# Patient Record
Sex: FEMALE | Race: WHITE | NOT HISPANIC OR LATINO | Employment: OTHER | URBAN - METROPOLITAN AREA
[De-identification: names, ages, dates, MRNs, and addresses within clinical notes are randomized per-mention and may not be internally consistent; named-entity substitution may affect disease eponyms.]

---

## 2017-02-13 ENCOUNTER — OFFICE VISIT (OUTPATIENT)
Dept: OBGYN CLINIC | Facility: HOSPITAL | Age: 82
End: 2017-02-13
Payer: MEDICARE

## 2017-02-20 ENCOUNTER — APPOINTMENT (OUTPATIENT)
Dept: LAB | Facility: HOSPITAL | Age: 82
End: 2017-02-20
Payer: MEDICARE

## 2017-02-20 ENCOUNTER — TRANSCRIBE ORDERS (OUTPATIENT)
Dept: LAB | Facility: HOSPITAL | Age: 82
End: 2017-02-20

## 2017-02-20 ENCOUNTER — OFFICE VISIT (OUTPATIENT)
Dept: OBGYN CLINIC | Facility: HOSPITAL | Age: 82
End: 2017-02-20
Payer: MEDICARE

## 2017-02-20 DIAGNOSIS — I10 ESSENTIAL (PRIMARY) HYPERTENSION: ICD-10-CM

## 2017-02-20 DIAGNOSIS — R54 AGE-RELATED PHYSICAL DEBILITY: ICD-10-CM

## 2017-02-20 DIAGNOSIS — E78.5 HYPERLIPIDEMIA: ICD-10-CM

## 2017-02-20 DIAGNOSIS — R51.9 HEADACHE: ICD-10-CM

## 2017-02-20 LAB
ALBUMIN SERPL BCP-MCNC: 3.5 G/DL (ref 3.5–5)
ALP SERPL-CCNC: 71 U/L (ref 46–116)
ALT SERPL W P-5'-P-CCNC: 20 U/L (ref 12–78)
ANION GAP SERPL CALCULATED.3IONS-SCNC: 7 MMOL/L (ref 4–13)
AST SERPL W P-5'-P-CCNC: 12 U/L (ref 5–45)
BILIRUB SERPL-MCNC: 0.5 MG/DL (ref 0.2–1)
BUN SERPL-MCNC: 24 MG/DL (ref 5–25)
CALCIUM SERPL-MCNC: 9.4 MG/DL (ref 8.3–10.1)
CHLORIDE SERPL-SCNC: 102 MMOL/L (ref 100–108)
CHOLEST SERPL-MCNC: 233 MG/DL (ref 50–200)
CO2 SERPL-SCNC: 29 MMOL/L (ref 21–32)
CREAT SERPL-MCNC: 1.2 MG/DL (ref 0.6–1.3)
ERYTHROCYTE [DISTWIDTH] IN BLOOD BY AUTOMATED COUNT: 12.9 % (ref 11.6–15.1)
GFR SERPL CREATININE-BSD FRML MDRD: 42.1 ML/MIN/1.73SQ M
GLUCOSE SERPL-MCNC: 94 MG/DL (ref 65–140)
HCT VFR BLD AUTO: 41.1 % (ref 34.8–46.1)
HDLC SERPL-MCNC: 76 MG/DL (ref 40–60)
HGB BLD-MCNC: 13.9 G/DL (ref 11.5–15.4)
LDLC SERPL CALC-MCNC: 135 MG/DL (ref 0–100)
MCH RBC QN AUTO: 29.5 PG (ref 26.8–34.3)
MCHC RBC AUTO-ENTMCNC: 33.8 G/DL (ref 31.4–37.4)
MCV RBC AUTO: 87 FL (ref 82–98)
PLATELET # BLD AUTO: 206 THOUSANDS/UL (ref 149–390)
PMV BLD AUTO: 11.4 FL (ref 8.9–12.7)
POTASSIUM SERPL-SCNC: 4.7 MMOL/L (ref 3.5–5.3)
PROT SERPL-MCNC: 8.5 G/DL (ref 6.4–8.2)
RBC # BLD AUTO: 4.71 MILLION/UL (ref 3.81–5.12)
SODIUM SERPL-SCNC: 138 MMOL/L (ref 136–145)
TRIGL SERPL-MCNC: 112 MG/DL
WBC # BLD AUTO: 7.31 THOUSAND/UL (ref 4.31–10.16)

## 2017-02-20 PROCEDURE — 80061 LIPID PANEL: CPT

## 2017-02-20 PROCEDURE — 85027 COMPLETE CBC AUTOMATED: CPT

## 2017-02-20 PROCEDURE — 80053 COMPREHEN METABOLIC PANEL: CPT

## 2017-02-20 PROCEDURE — 36415 COLL VENOUS BLD VENIPUNCTURE: CPT

## 2017-03-02 ENCOUNTER — ALLSCRIPTS OFFICE VISIT (OUTPATIENT)
Dept: OTHER | Facility: OTHER | Age: 82
End: 2017-03-02

## 2017-03-10 ENCOUNTER — ALLSCRIPTS OFFICE VISIT (OUTPATIENT)
Dept: OTHER | Facility: OTHER | Age: 82
End: 2017-03-10

## 2017-03-22 ENCOUNTER — GENERIC CONVERSION - ENCOUNTER (OUTPATIENT)
Dept: OTHER | Facility: OTHER | Age: 82
End: 2017-03-22

## 2017-04-06 ENCOUNTER — OFFICE VISIT (OUTPATIENT)
Dept: OBGYN CLINIC | Facility: HOSPITAL | Age: 82
End: 2017-04-06
Payer: MEDICARE

## 2017-04-13 ENCOUNTER — OFFICE VISIT (OUTPATIENT)
Dept: OBGYN CLINIC | Facility: HOSPITAL | Age: 82
End: 2017-04-13
Payer: MEDICARE

## 2017-05-26 ENCOUNTER — GENERIC CONVERSION - ENCOUNTER (OUTPATIENT)
Dept: OTHER | Facility: OTHER | Age: 82
End: 2017-05-26

## 2017-05-26 ENCOUNTER — GENERIC CONVERSION - ENCOUNTER (OUTPATIENT)
Dept: FAMILY MEDICINE CLINIC | Facility: CLINIC | Age: 82
End: 2017-05-26

## 2017-05-31 ENCOUNTER — OFFICE VISIT (OUTPATIENT)
Dept: OBGYN CLINIC | Facility: HOSPITAL | Age: 82
End: 2017-05-31
Payer: MEDICARE

## 2017-06-06 ENCOUNTER — OFFICE VISIT (OUTPATIENT)
Dept: OBGYN CLINIC | Facility: HOSPITAL | Age: 82
End: 2017-06-06
Payer: MEDICARE

## 2017-07-17 DIAGNOSIS — N28.9 DISORDER OF KIDNEY AND URETER: ICD-10-CM

## 2017-07-17 DIAGNOSIS — I10 ESSENTIAL (PRIMARY) HYPERTENSION: ICD-10-CM

## 2017-07-17 DIAGNOSIS — E78.5 HYPERLIPIDEMIA: ICD-10-CM

## 2017-07-20 ENCOUNTER — TRANSCRIBE ORDERS (OUTPATIENT)
Dept: LAB | Facility: HOSPITAL | Age: 82
End: 2017-07-20

## 2017-07-20 ENCOUNTER — APPOINTMENT (OUTPATIENT)
Dept: LAB | Facility: HOSPITAL | Age: 82
End: 2017-07-20
Payer: MEDICARE

## 2017-07-20 DIAGNOSIS — N28.9 DISORDER OF KIDNEY AND URETER: ICD-10-CM

## 2017-07-20 DIAGNOSIS — E78.5 HYPERLIPIDEMIA: ICD-10-CM

## 2017-07-20 DIAGNOSIS — I10 ESSENTIAL (PRIMARY) HYPERTENSION: ICD-10-CM

## 2017-07-20 LAB
ALBUMIN SERPL BCP-MCNC: 3.4 G/DL (ref 3.5–5)
ALP SERPL-CCNC: 69 U/L (ref 46–116)
ALT SERPL W P-5'-P-CCNC: 19 U/L (ref 12–78)
ANION GAP SERPL CALCULATED.3IONS-SCNC: 8 MMOL/L (ref 4–13)
AST SERPL W P-5'-P-CCNC: 13 U/L (ref 5–45)
BASOPHILS # BLD AUTO: 0.03 THOUSANDS/ΜL (ref 0–0.1)
BASOPHILS NFR BLD AUTO: 0 % (ref 0–1)
BILIRUB SERPL-MCNC: 0.49 MG/DL (ref 0.2–1)
BUN SERPL-MCNC: 28 MG/DL (ref 5–25)
CALCIUM SERPL-MCNC: 9.1 MG/DL (ref 8.3–10.1)
CHLORIDE SERPL-SCNC: 106 MMOL/L (ref 100–108)
CHOLEST SERPL-MCNC: 204 MG/DL (ref 50–200)
CO2 SERPL-SCNC: 26 MMOL/L (ref 21–32)
CREAT SERPL-MCNC: 1.3 MG/DL (ref 0.6–1.3)
EOSINOPHIL # BLD AUTO: 0.2 THOUSAND/ΜL (ref 0–0.61)
EOSINOPHIL NFR BLD AUTO: 3 % (ref 0–6)
ERYTHROCYTE [DISTWIDTH] IN BLOOD BY AUTOMATED COUNT: 12.7 % (ref 11.6–15.1)
GFR SERPL CREATININE-BSD FRML MDRD: 38.3 ML/MIN/1.73SQ M
GLUCOSE P FAST SERPL-MCNC: 101 MG/DL (ref 65–99)
HCT VFR BLD AUTO: 37 % (ref 34.8–46.1)
HDLC SERPL-MCNC: 60 MG/DL (ref 40–60)
HGB BLD-MCNC: 12.9 G/DL (ref 11.5–15.4)
LDLC SERPL CALC-MCNC: 123 MG/DL (ref 0–100)
LYMPHOCYTES # BLD AUTO: 2.18 THOUSANDS/ΜL (ref 0.6–4.47)
LYMPHOCYTES NFR BLD AUTO: 32 % (ref 14–44)
MCH RBC QN AUTO: 30.1 PG (ref 26.8–34.3)
MCHC RBC AUTO-ENTMCNC: 34.9 G/DL (ref 31.4–37.4)
MCV RBC AUTO: 86 FL (ref 82–98)
MONOCYTES # BLD AUTO: 0.29 THOUSAND/ΜL (ref 0.17–1.22)
MONOCYTES NFR BLD AUTO: 4 % (ref 4–12)
NEUTROPHILS # BLD AUTO: 4.19 THOUSANDS/ΜL (ref 1.85–7.62)
NEUTS SEG NFR BLD AUTO: 61 % (ref 43–75)
NRBC BLD AUTO-RTO: 0 /100 WBCS
PLATELET # BLD AUTO: 177 THOUSANDS/UL (ref 149–390)
PMV BLD AUTO: 11.8 FL (ref 8.9–12.7)
POTASSIUM SERPL-SCNC: 4.5 MMOL/L (ref 3.5–5.3)
PROT SERPL-MCNC: 7.9 G/DL (ref 6.4–8.2)
RBC # BLD AUTO: 4.29 MILLION/UL (ref 3.81–5.12)
SODIUM SERPL-SCNC: 140 MMOL/L (ref 136–145)
TRIGL SERPL-MCNC: 106 MG/DL
TSH SERPL DL<=0.05 MIU/L-ACNC: 2.43 UIU/ML (ref 0.36–3.74)
WBC # BLD AUTO: 6.9 THOUSAND/UL (ref 4.31–10.16)

## 2017-07-20 PROCEDURE — 80053 COMPREHEN METABOLIC PANEL: CPT

## 2017-07-20 PROCEDURE — 80061 LIPID PANEL: CPT

## 2017-07-20 PROCEDURE — 84443 ASSAY THYROID STIM HORMONE: CPT

## 2017-07-20 PROCEDURE — 36415 COLL VENOUS BLD VENIPUNCTURE: CPT

## 2017-07-20 PROCEDURE — 85025 COMPLETE CBC W/AUTO DIFF WBC: CPT

## 2017-08-01 ENCOUNTER — OFFICE VISIT (OUTPATIENT)
Dept: OBGYN CLINIC | Facility: HOSPITAL | Age: 82
End: 2017-08-01
Payer: MEDICARE

## 2017-08-03 ENCOUNTER — ALLSCRIPTS OFFICE VISIT (OUTPATIENT)
Dept: OTHER | Facility: OTHER | Age: 82
End: 2017-08-03

## 2017-08-08 ENCOUNTER — OFFICE VISIT (OUTPATIENT)
Dept: OBGYN CLINIC | Facility: HOSPITAL | Age: 82
End: 2017-08-08
Payer: MEDICARE

## 2017-08-28 DIAGNOSIS — N18.30 CHRONIC KIDNEY DISEASE, STAGE III (MODERATE) (HCC): ICD-10-CM

## 2017-08-28 DIAGNOSIS — I10 ESSENTIAL (PRIMARY) HYPERTENSION: ICD-10-CM

## 2017-08-31 ENCOUNTER — APPOINTMENT (OUTPATIENT)
Dept: LAB | Facility: HOSPITAL | Age: 82
End: 2017-08-31
Payer: MEDICARE

## 2017-08-31 ENCOUNTER — TRANSCRIBE ORDERS (OUTPATIENT)
Dept: LAB | Facility: HOSPITAL | Age: 82
End: 2017-08-31

## 2017-08-31 DIAGNOSIS — N18.30 CHRONIC KIDNEY DISEASE, STAGE III (MODERATE) (HCC): ICD-10-CM

## 2017-08-31 DIAGNOSIS — I10 ESSENTIAL (PRIMARY) HYPERTENSION: ICD-10-CM

## 2017-08-31 LAB
ANION GAP SERPL CALCULATED.3IONS-SCNC: 6 MMOL/L (ref 4–13)
BUN SERPL-MCNC: 21 MG/DL (ref 5–25)
CALCIUM SERPL-MCNC: 9.3 MG/DL (ref 8.3–10.1)
CHLORIDE SERPL-SCNC: 102 MMOL/L (ref 100–108)
CO2 SERPL-SCNC: 28 MMOL/L (ref 21–32)
CREAT SERPL-MCNC: 1.02 MG/DL (ref 0.6–1.3)
GFR SERPL CREATININE-BSD FRML MDRD: 48 ML/MIN/1.73SQ M
GLUCOSE SERPL-MCNC: 86 MG/DL (ref 65–140)
POTASSIUM SERPL-SCNC: 4.4 MMOL/L (ref 3.5–5.3)
SODIUM SERPL-SCNC: 136 MMOL/L (ref 136–145)

## 2017-08-31 PROCEDURE — 80048 BASIC METABOLIC PNL TOTAL CA: CPT

## 2017-08-31 PROCEDURE — 36415 COLL VENOUS BLD VENIPUNCTURE: CPT

## 2017-09-04 ENCOUNTER — GENERIC CONVERSION - ENCOUNTER (OUTPATIENT)
Dept: OTHER | Facility: OTHER | Age: 82
End: 2017-09-04

## 2017-10-06 ENCOUNTER — ALLSCRIPTS OFFICE VISIT (OUTPATIENT)
Dept: OTHER | Facility: OTHER | Age: 82
End: 2017-10-06

## 2017-10-07 NOTE — PROGRESS NOTES
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)    Plan  CKD (chronic kidney disease), stage III, Hypertension    · AmLODIPine Besylate 2 5 MG Oral Tablet; TAKE 1 TABLET DAILY  Influenza vaccine needed    · Fluzone High-Dose 0 5 ML Intramuscular Suspension Prefilled Syringe    Discussion/Summary    BP is stable on current regimen  has improved on calcium channel blocker and lower dose of ACE-inhibitor  agreement contract updated today  has follow-up appointment in February with repeat labs  The patient was counseled regarding diagnostic results,-instructions for management,-risk factor reductions,-prognosis,-patient and family education,-impressions,-risks and benefits of treatment options,-importance of compliance with treatment  Possible side effects of new medications were reviewed with the patient/guardian today  The treatment plan was reviewed with the patient/guardian  The patient/guardian understands and agrees with the treatment plan     Self Referrals: No      Chief Complaint  pt here for follow up visitwill receive flu vaccine todayscale negative      History of Present Illness  This is a follow medication adjustment - added amlodipine 2 5 mg daily/ pt's daughter decrease lisinopril by half as she thought this was the instructions  daughter reports 's/60's at home - on HCTZ 12 5/ amlodipine 2 5 and 1/2 tab lisinopril 40 mg today is in the 140s  noted and creatinine 1 02 on this regimen  Patient denies dizziness or GI upset  also here for opioid agreement contract update  is taking tramadol in am for knee pain and as needed Tylenol  does take it twice a day for sever pain - bad days  PDMP rviewed           Review of Systems    Constitutional: No fever, no chills, feels well, no tiredness, no recent weight gain or weight loss-and-as noted in HPI  Cardiovascular: No complaints of slow heart rate, no fast heart rate, no chest pain, no palpitations, no leg claudication, no lower extremity edema  Respiratory: No complaints of shortness of breath, no wheezing, no cough, no SOB on exertion, no orthopnea, no PND  Gastrointestinal: No complaints of abdominal pain, no constipation, no nausea or vomiting, no diarrhea, no bloody stools  Neurological: No complaints of headache, no confusion, no convulsions, no numbness, no dizziness or fainting, no tingling, no limb weakness, no difficulty walking  Active Problems  1  Cervicalgia (723 1) (M54 2)   2  CKD (chronic kidney disease), stage III (585 3) (N18 3)   3  Copy of advanced directive obtained (V49 89) (Z78 9)   4  Counseling regarding advanced directives and goals of care (V65 49) (Z71 89)   5  Dyslipidemia (272 4) (E78 5)   6  Encounter for Medicare annual wellness exam (V70 0) (Z00 00)   7  Encounter for screening for malignant neoplasm of colon (V76 51) (Z12 11)   8  Encounter for screening for other nervous system disorder (V80 09) (Z13 858)   9  Encounter for special screening examination for genitourinary disorder (V81 6) (Z13 89)   10  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)   11  Hypertension (401 9) (I10)   12  Impacted cerumen of right ear (380 4) (H61 21)   13  Influenza vaccine needed (V04 81) (Z23)   14  History of Intermittent claudication (443 9) (I73 9)   15  Medicare annual wellness visit, subsequent (V70 0) (Z00 00)   16  Nonintractable episodic headache, unspecified headache type (784 0) (R51)   17  Osteoarthritis (715 90) (M19 90)   18  Pain management contract agreement   19  Scanned copy of advance directives on file (V49 89) (Z78 9)   20  Screening for osteoporosis (V82 81) (Z13 820)   21  Seasonal allergies (477 9) (J30 2)   22   Visit for screening mammogram (V76 12) (Z12 31)    Past Medical History  1  History of Bilateral impacted cerumen (380 4) (H61 23)   2  History of Chronic Renal Failure (585 9)   3  History of edema (V13 89) (Z87 898)   4  History of headache (V13 89) (Z87 898)   5  History of Impacted cerumen of left ear (380 4) (H61 22)   6  History of Impaired fasting glucose (790 21) (R73 01)   7  History of Intermittent claudication (443 9) (I73 9)   8  History of Reported Test For Stool Blood Positive   9  History of Vitamin D deficiency (268 9) (E55 9)    The active problems and past medical history were reviewed and updated today  Surgical History  1  History of Cataract Surgery   2  History of Knee Arthroplasty   3  History of Knee Replacement    The surgical history was reviewed and updated today  Family History  Mother    1  Family history of Diabetes Mellitus (V18 0)  Father    2  Family history of Emphysema  Sister    3  Family history of Hypertension (V17 49)  Family History    4  Family history of Does not use illicit drugs   5  No family history of mental disorder    The family history was reviewed and updated today  Social History   · Brushes teeth daily   · Copy of advanced directive obtained (V49 89) (Z78 9)   · Does not exercise (V69 0) (Z72 3)   · Does not use illicit drugs (A23 29) (Z78 9)   · Never A Smoker   · No alcohol use  The social history was reviewed and updated today  Current Meds   1  Acetaminophen 500 MG CAPS; TAKE 2 CAPSULES every 8 hrs for back pain/ HA; not to   exceed 6 tabs in a day; Therapy: 32DUO8748 to Recorded   2  AmLODIPine Besylate 2 5 MG Oral Tablet; TAKE 1 TABLET DAILY; Therapy: 08Cum5895 to (Evaluate:47Vdd9985)  Requested for: 03Aug2017; Last   Rx:03Aug2017 Ordered   3  Caltrate 600 TABS; TAKE 1 TABLET DAILY; Therapy: (Recorded:60Mdm2543) to Recorded   4  Debrox 6 5 % Otic Solution;  Instill 10 drops at night x 5 days then rinse ear canal w/   peroxide in am;   Therapy: 11JMI5228 to (Last Rx:02Mar2017)  Requested for: 53LMK7726 Ordered   5  Fish Oil 1000 MG Oral Capsule; TAKE 2 CAPSULE Daily; Therapy: (Recorded:11Zpu9542) to Recorded   6  Fluticasone Propionate 50 MCG/ACT Nasal Suspension; USE 2 SPRAYS IN EACH   NOSTRIL ONCE DAILY; Therapy: 09ORK2944 to (Last Rx:14Oct2016)  Requested for: 14Oct2016 Ordered   7  Glucosamine Chondroitin Complx Oral Capsule; TAKE 1 CAPSULE DAILY; Therapy: (Recorded:49Wjj5300) to Recorded   8  HydroCHLOROthiazide 12 5 MG Oral Capsule; TAKE 1 CAPSULE EVERY DAY; Therapy: 75MHA0639 to (Evaluate:89Hzl6443)  Requested for: 59WAN3995; Last   Rx:29Jun2017 Ordered   9  Lisinopril 40 MG Oral Tablet; TAKE 1/2 TABLET DAILY; Therapy: 04Tka4704 to (Livan Gayle)  Requested for: 48YME8395 Recorded   10  Multi Vitamin Daily Oral Tablet; Take 1 daily; Therapy: 58LBV8330 to Recorded   11  TraMADol HCl - 50 MG Oral Tablet; TAKE 1 TABLET EVERY 12 HOURS AS NEEDED; Therapy: 37QFK3034 to (Manjinder Roberts)  Requested for: 59Blm4478; Last    Rx:03Eza7184 Ordered   12  Vitamin C-Quercetin-Citrus Bio [phone] MG Oral Capsule; Therapy: 12Apr2016 to Recorded   13  Vitamin D 2000 UNIT Oral Capsule; Take 1 tablet daily; Therapy: (Recorded:43Xzj1685) to Recorded    The medication list was reviewed and updated today  Allergies  1  Norvasc TABS  Denied    2  Aceon TABS    Vitals  Vital Signs    Recorded: 34PVV7961 09:09AM   Temperature 99 F   Heart Rate 79   Systolic 531   Diastolic 64   Height 4 ft 9 in   Weight 149 lb    BMI Calculated 32 24   BSA Calculated 1 59   O2 Saturation 95   Pain Scale 0     Physical Exam    Constitutional   General appearance: No acute distress, well appearing and well nourished  Eyes   Conjunctiva and lids: No swelling, erythema or discharge  Pupils and irises: Equal, round and reactive to light  Pulmonary   Respiratory effort: No increased work of breathing or signs of respiratory distress  Auscultation of lungs: Clear to auscultation  Cardiovascular   Palpation of heart: Normal PMI, no thrills  Auscultation of heart: Normal rate and rhythm, normal S1 and S2, without murmurs  Neurologic   Cranial nerves: Cranial nerves 2-12 intact  Psychiatric   Orientation to person, place, and time: Normal  -[place]  Mood and affect: Normal          Results/Data  PHQ-2 Adult Depression Screening 83XCA9751 09:27AM User, Vadim     Test Name Result Flag Reference   PHQ-2 Adult Depression Score 0     Over the last two weeks, how often have you been bothered by any of the following problems? Little interest or pleasure in doing things: Not at all - 0  Feeling down, depressed, or hopeless: Not at all - 0   PHQ-2 Adult Depression Screening Negative       (1) BASIC METABOLIC PROFILE 46HZQ4657 10:35AM Bernarda Rodríguez    Order Number: [id number]     Test Name Result Flag Reference   GLUCOSE,RANDM 86 mg/dL     If the patient is fasting, the ADA then defines impaired fasting glucose as > 100 mg/dL and diabetes as > or equal to 123 mg/dL  Specimen collection should occur prior to Sulfasalazine administration due to the potential for falsely depressed results  Specimen collection should occur prior to Sulfapyridine administration due to the potential for falsely elevated results  SODIUM 136 mmol/L  136-145   POTASSIUM 4 4 mmol/L  3 5-5 3   CHLORIDE 102 mmol/L  100-108   CARBON DIOXIDE 28 mmol/L  21-32   ANION GAP (CALC) 6 mmol/L  4-13   BLOOD UREA NITROGEN 21 mg/dL  5-25   CREATININE 1 02 mg/dL  0 60-1 30   Standardized to IDMS reference method   CALCIUM 9 3 mg/dL  8 3-10 1   eGFR 48 ml/min/1 73sq [facility] [employer] Disease Education Program recommendations are as follows:  GFR calculation is accurate only with a steady state creatinine  Chronic Kidney disease less than 60 ml/min/1 73 sq  meters  Kidney failure less than 15 ml/min/1 73 sq  meters       Health Management  Encounter for screening for other nervous system disorder   *VB - Fall Risk Assessment  (Dx Z13 89 Screen for Neurologic Disorder); every 1 year; Last  18KUV0645; Next Due: 97KEX6014; Active  Encounter for special screening examination for genitourinary disorder   *VB - Urinary Incontinence Screen (Dx Z13 89 Screen for UI); every 1 year; Last 32SNQ2630; Next  Due: 06ZQI4783; Active  Influenza vaccine needed   Fluzone High-Dose Intramuscular Suspension; every 1 year; Last 14XWR5755; Next Due: 06BZY3166; Overdue  Osteoarthritis   * Dexa Scan Axial Skel (Hip, Pelvis, Spine); every 2 years; Last 82ZAI6081; Next Due: 89Qjj4306; Active    Attending Note  Collaborating Note: I agree with the Advanced Practitioner note        Future Appointments    Date/Time Provider Specialty Site   02/22/2018 09:30 AM [address] [facility]     Signatures   Electronically signed by : Denise Marsh; Oct  6 2017 10:49AM EST                       (Author)    Electronically signed by : NAMAN Griffin ; Oct  6 2017 11:51AM EST                       (Author)

## 2017-10-09 ENCOUNTER — OFFICE VISIT (OUTPATIENT)
Dept: OBGYN CLINIC | Facility: HOSPITAL | Age: 82
End: 2017-10-09
Payer: MEDICARE

## 2017-10-10 ENCOUNTER — OFFICE VISIT (OUTPATIENT)
Dept: OBGYN CLINIC | Facility: HOSPITAL | Age: 82
End: 2017-10-10
Payer: MEDICARE

## 2017-12-06 ENCOUNTER — OFFICE VISIT (OUTPATIENT)
Dept: OBGYN CLINIC | Facility: HOSPITAL | Age: 82
End: 2017-12-06
Payer: MEDICARE

## 2017-12-12 ENCOUNTER — OFFICE VISIT (OUTPATIENT)
Dept: OBGYN CLINIC | Facility: HOSPITAL | Age: 82
End: 2017-12-12
Payer: MEDICARE

## 2018-01-09 NOTE — RESULT NOTES
Message   Dexa scan: low bone density (osteopenia) in lt hip otherwise okay  Continue weight baring and calcium/vit D supplements as per med list      Verified Results  * DXA BONE DENSITY SPINE HIP AND PELVIS 28Apr2016 09:54AM [employer] Order Number: [id number]     Test Name Result Flag Reference   DXA BONE DENSITY SPINE HIP AND PELVIS (Report)     CENTRAL DXA SCAN     CLINICAL HISTORY:  80year old post-menopausal  female risk factors include family history of osteoporosis  Degenerative arthritis  Diuretic use  Osteoporosis screening  TECHNIQUE: Bone densitometry was performed using a Hologic Horizon A  bone densitometer  Regions of interest appear properly placed  There are no obvious fractures or other confounding variables which could limit the study  Degenerative changes of the   lumbar spine and hip  This will falsely elevate the bone mineral densities in these regions  COMPARISON: None  RESULTS:    LUMBAR SPINE: L1-L4:   BMD 1 275 gm/cm2   T-score 2 1   Z-score 4 9     LEFT TOTAL HIP:   BMD 0 867 gm/cm2   T-score -0 6   Z-score 1 7     LEFT FEMORAL NECK:   BMD 0 690 gm/cm2   T-score -1 4   Z-score 1 1           ASSESSMENT:   1  Based on the WHO classification, the T-score of -1 4 in the left hip is consistent with low bone mineral density  2  The 10 year risk of hip fracture is 3 % with the 10 year risk of major osteoporotic fracture being 10 % as calculated by the WHO fracture risk assessment tool (FRAX)  The current NOF guidelines recommend treating patients with FRAX 10 year risk    score of >3% for hip fracture and >20% for major osteoporotic fracture  3  Any secondary causes of low bone mineral density should be excluded prior to treatment, if clinically indicated     4  A daily intake of at least 1200 mg calcium and 800 - 1000 IU of Vitamin D, as well as weight bearing and muscle strengthening exercise, fall prevention and avoidance of tobacco and excessive alcohol intake as basic preventive measures are suggested                       WHO CLASSIFICATION:   Normal (a T-score of -1 0 or higher)   Low bone mineral density (a T-score of less than -1 0 but higher than -2 5)   Osteoporosis (a T-score of -2 5 or less)   Severe osteoporosis (a T-score of -2 5 or less with a fragility fracture)             Workstation performed: [id number]

## 2018-01-10 NOTE — PROCEDURES
Chief Complaint  here for flushing/ wax removal RT ear      Current Meds   1  Acetaminophen 500 MG Oral Capsule; TAKE 2 CAPSULES every 8 hrs for back pain/ HA;   not to exceed 6 tabs in a day; Therapy: 01MTY2627 to Recorded   2  Caltrate 600 TABS; TAKE 1 TABLET DAILY; Therapy: (Recorded:68Fyr6063) to Recorded   3  Debrox 6 5 % Otic Solution; Instill 10 drops at night x 5 days then rinse ear canal w/   peroxide in am;   Therapy: 00VMK4898 to (Last Rx:02Mar2017)  Requested for: 82YGO5714 Ordered   4  Fish Oil 1000 MG Oral Capsule; TAKE 2 CAPSULE Daily; Therapy: (Recorded:82Ths9350) to Recorded   5  Fluticasone Propionate 50 MCG/ACT Nasal Suspension; USE 2 SPRAYS IN EACH   NOSTRIL ONCE DAILY; Therapy: 89NET1821 to (Last Rx:14Oct2016)  Requested for: 09Bqb6309 Ordered   6  Glucosamine Chondroitin Complx Oral Capsule; TAKE 1 CAPSULE DAILY; Therapy: (Recorded:62Vlt5442) to Recorded   7  HydroCHLOROthiazide 12 5 MG Oral Capsule; TAKE 1 CAPSULE ONE TIME DAILY; Therapy: 52ZVG4970 to (Evaluate:41Wcd6447)  Requested for: 80YPA8496; Last   Rx:69Cpg7227 Ordered   8  Ketorolac Tromethamine 0 4 % Ophthalmic Solution; Therapy: 83Lcv2278 to Recorded   9  Lisinopril 40 MG Oral Tablet; Take 1 tablet daily; Therapy: 46Aep6501 to (Evaluate:24Upu2742)  Requested for: 80AIJ0238; Last   Rx:16Jnv9722 Ordered   10  Multi Vitamin Daily Oral Tablet; Take 1 daily; Therapy: 96QPR1207 to Recorded   11  TraMADol HCl - 50 MG Oral Tablet; TAKE 1 TABLET EVERY 12 HOURS AS NEEDED; Therapy: 51CHN1256 to (Evaluate:35Vfs7202)  Requested for: 45RRW0008; Last    Rx:02Mar2017 Ordered   12  Vitamin C-Quercetin-Citrus Bio [phone] MG Oral Capsule; Therapy: 77Zpd9133 to Recorded   13  Vitamin D 2000 UNIT Oral Capsule; Take 1 tablet daily; Therapy: (Recorded:09Psk1085) to Recorded    Allergies    1  Aceon TABS   2   Norvasc TABS    Vitals  Signs    Temperature: 97 7 F  Heart Rate: 85  Respiration: 20  Systolic: 130  Diastolic: 60  Height: 4 ft 9 5 in  Weight: 149 lb 8 0 oz  BMI Calculated: 31 79  BSA Calculated: 1 6  O2 Saturation: 98  Pain Scale: 0    Procedure    Procedure: cerumen removal    Indication: tympanic membrane(s) could not be visualized and cerumen impaction in the right ear  Procedure Note: The procedure was performed by the Provider and lasted 20 minute(s)   The procedure required significant time and effort to remove the cerumen  A otoscope was placed in the ear canal(s) to visualize the ear canal debris  The ear was cleaned by using warm water irrigation and a curette  The procedure was successful  Post-Procedure:   Patient Status: the patient tolerated the procedure well  Complications: there were no complications  Patient instructions: dry ear precautions and avoid using q-tips  Follow-up as needed  Assessment    1  Impacted cerumen of right ear (380 4) (H61 21)    Plan  Impacted cerumen of right ear    · Removal Impacted Cerumen Requiring Instrumentation one or both ears - POC;  Status:Complete;   Done: 02HSZ9144    Discussion/Summary  Patient is able to Self-Care  Patient is unable to Self-Care: Patient agrees and allows to involve family/caregiver in development of care plan:   Possible side effects of new medications were reviewed with the patient/guardian today  The treatment plan was reviewed with the patient/guardian  The patient/guardian understands and agrees with the treatment plan     Self Referrals: No      Future Appointments    Date/Time Provider Specialty Site   08/03/2017 10:30 AM Latricia Mack 476     Signatures   Electronically signed by : Mariia Sanchez; Mar 10 2017  4:49PM EST                       (Author)    Electronically signed by :  Lori Leach MD; Mar 10 2017  5:05PM EST                       (Review)

## 2018-01-11 NOTE — PROGRESS NOTES
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)   5  Copy of advanced directive obtained (V49 89) (Z78 9)   6  Scanned copy of advance directives on file (V49 89) (Z78 9)    Plan  Cervicalgia, Osteoarthritis    · TraMADol HCl - 50 MG Oral Tablet; TAKE 1 TABLET EVERY 12 HOURS AS  NEEDED  Dyslipidemia, Hypertension, Renal insufficiency    · (1) CBC/PLT/DIFF; Status:Active; Requested for:78Mfe7083;    · (1) COMPREHENSIVE METABOLIC PANEL; Status:Active; Requested for:26Fjj7206;    · (1) LIPID PANEL, FASTING; Status:Active; Requested for:67Oso4160;    · (1) TSH WITH FT4 REFLEX; Status:Active; Requested for:45Cwi2827;   Encounter for screening for other nervous system disorder    · *VB - Fall Risk Assessment  (Dx Z13 89 Screen for Neurologic Disorder);  Status:Complete;   Done: 05KDE4982 12:00AM   · *VB - Fall Risk Assessment  (Dx Z13 89 Screen for Neurologic Disorder) ; every 1 year; Last 29VLF0048; Next 15QQE6121; Status:Active  Encounter for special screening examination for genitourinary disorder    · *VB - Urinary Incontinence Screen (Dx Z13 89 Screen for UI); Status:Complete;   Done:  03UUP1358 12:00AM   · *VB - Urinary Incontinence Screen (Dx Z13 89 Screen for UI) ; every 1 year; Last  16UYW1264; Next 17HTV1185; Status:Active  Impacted cerumen of left ear    · Debrox 6 5 % Otic Solution; Instill 10 drops at night x 5 days then rinse ear canal w/  peroxide in am  Impacted cerumen of right ear    · Removal Impacted Cerumen Requiring Instrumentation one or both ears - POC;  Status:Complete;   Done: 30YND8655  Influenza vaccine needed    · Fluzone High-Dose Intramuscular Suspension (Fluzone High-Dose  SUSP)   · Fluzone High-Dose Intramuscular Suspension ; every 1 year; Last 90LIZ3087;  Next  88COC0110; Status:Active  Osteoarthritis    · * Dexa Scan Axial Tano (Hip, Pelvis, Spine) ; every 1 year; Deferred 19TQZ1253;  Status:Temporary Deferral    Discussion/Summary    Return for follow up in aug after labs  Impression: Subsequent Annual Wellness Visit, with preventive exam as well as age and risk appropriate counseling completed  Cardiovascular screening and counseling: the risks and benefits of screening were discussed  Diabetes screening and counseling: the risks and benefits of screening were discussed  Colorectal cancer screening and counseling: screening not indicated  Breast cancer screening and counseling: screening not indicated  Cervical cancer screening and counseling: screening not indicated  Osteoporosis screening and counseling: the risks and benefits of screening were discussed, the patient declines screening, counseling was given on obtaining adequate amounts of calcium and vitamin D on a daily basis and counseling was given on the importance of regular weightbearing exercise  Abdominal aortic aneurysm screening and counseling: screening not indicated  Glaucoma screening and counseling: the risks and benefits of screening were discussed and screening is current  HIV screening and counseling: screening not indicated  Immunizations: influenza vaccine is up to date this year, UTD, hepatitis B vaccination series is not indicated at this time due to the patient's low risk of kameron the disease, the patient declines the Zostavax vaccine, the risks and benefits of the Tdap vaccine were discussed with the patient and the patient declines the Tdap vaccine  Advance Directive Planning: complete and up to date, she was encouraged to follow-up with me to discuss her questions and/or decisions  Patient Discussion: plan discussed with the patient, plan discussed with the patient's family, follow-up visit needed in one year       Self Referrals: No      History of Present Illness  The patient is being seen for the subsequent annual wellness visit  Medicare Screening and Risk Factors   Hospitalizations: no previous hospitalizations  Medicare Screening Tests Risk Questions   Abdominal aortic aneurysm risk assessment: none indicated  Osteoporosis risk assessment: , female gender and over 48years of age  HIV risk assessment: none indicated  Drug and Alcohol Use: The patient has never smoked cigarettes  The patient reports never drinking alcohol  She has never used illicit drugs  Diet and Physical Activity: Current diet includes well balanced meals, 3 servings of fruit per day, 4 servings of vegetables per day, 1 servings of meat per day, 2 servings of whole grains per day, 1 servings of dairy products per day, 3 cups of coffee per day and 3 glasses of water/ day  She exercises daily  Exercise: walking 20 minutes per day  Mood Disorder and Cognitive Impairment Screening: She denies feeling down, depressed, or hopeless over the past two weeks  She denies feeling little interest or pleasure in doing things over the past two weeks  Cognitive impairment screening: denies difficulty learning/retaining new information, difficulty handling complex tasks, denies difficulty with reasoning, denies difficulty with spatial ability and orientation, denies difficulty with language and denies difficulty with behavior  Functional Ability/Level of Safety: Hearing is normal in the right ear, significantly decreased in the left ear and a hearing aid is not used  She reports hearing difficulties  The patient is currently able to do instrumental activities of daily living with limitations and unable to drive, but able to do activities of daily living without limitations and able to participate in social activities without limitations   Activities of daily living details: needs help managing medications and needs help managing money, but does not need help using the phone, no transportation help needed, does not need help shopping, no meal preparation help needed, does not need help doing housework and does not need help doing laundry  Fall risk factors:  polypharmacy, mobility impairment, antihypertensive use, cognitive impairment and mild cognitive impairment - needs help w/ complex task meds/ money, but The patient fell 0 times in the past 12 months , no alcohol use, no antidepressant use, no deconditioning, no postural hypotension, no sedative use, no visual impairment, no urinary incontinence, up and go test was normal and no previous fall  Home safety risk factors:  loose rugs, no grab bars in the bathroom and no handrails on the stairs, but no unfamiliar surroundings, no poor household lighting, no uneven floors and no household clutter  Advance Directives: Advance directives: advance directives  Co-Managers and Medical Equipment/Suppliers: See Patient Care Team     Last Medicare Wellness Visit Information was reviewed, patient interviewed and updates made to the record today  Falls Risk: The patient fell 0 times in the past 12 months  The patient is currently asymptomatic Symptoms Include:  Associated symptoms:  No associated symptoms are reported  The patient currently has no urinary incontinence symptoms  Date of last glaucoma screen was 05/19/2016      Patient Care Team    Care Team Member Role Specialty Office Number   Wen Tanner MD  Ophthalmology [phone]   [address] [place] [address] [phone]   Roberto Martin DPM  Podiatry [phone]     Review of Systems    Constitutional: negative  Eyes: negative  ENT: negative  Cardiovascular: HTN, but negative and as noted in HPI  Respiratory: cough and seasonal allergies, but negative  Gastrointestinal: negative  Genitourinary: negative  Musculoskeletal: joint stiffness and bilat knee pain/ stiffness, but negative  Integumentary and Breasts: negative  Neurological: headache and intermitent HA, but negative  Psychiatric: negative  Endocrine: negative  Hematologic and Lymphatic: negative  Active Problems    1  Cervicalgia (723 1) (M54 2)   2  Counseling regarding advanced directives and goals of care (V65 49) (Z71 89)   3  Dyslipidemia (272 4) (E78 5)   4  Encounter for Medicare annual wellness exam (V70 0) (Z00 00)   5  Encounter for screening for malignant neoplasm of colon (V76 51) (Z12 11)   6  Encounter for screening for other nervous system disorder (V80 09) (Z13 858)   7  Encounter for special screening examination for genitourinary disorder (V81 6) (Z13 89)   8  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)   9  Head ache (784 0) (R51)   10  Hypertension (401 9) (I10)   11  Influenza vaccine needed (V04 81) (Z23)   12  History of Intermittent claudication (443 9) (I73 9)   13  Medicare annual wellness visit, subsequent (V70 0) (Z00 00)   14  Nonintractable episodic headache, unspecified headache type (784 0) (R51)   15  Osteoarthritis (715 90) (M19 90)   16  Pain management contract agreement   17  Renal insufficiency (593 9) (N28 9)   18  Screening for osteoporosis (V82 81) (Z13 820)   19  Seasonal allergies (477 9) (J30 2)   20  Visit for screening mammogram (V76 12) (Z12 31)    Past Medical History    1  History of Bilateral impacted cerumen (380 4) (H61 23)   2  History of Chronic Renal Failure (585 9)   3  History of edema (V13 89) (Z87 898)   4  History of Impaired fasting glucose (790 21) (R73 01)   5  History of Intermittent claudication (443 9) (I73 9)   6  History of Reported Test For Stool Blood Positive   7  History of Vitamin D deficiency (268 9) (E55 9)    The active problems and past medical history were reviewed and updated today  Surgical History    1  History of Cataract Surgery   2  History of Knee Arthroplasty   3  History of Knee Replacement    The surgical history was reviewed and updated today  Family History  Mother    1  Family history of Diabetes Mellitus (V18 0)  Father    2  Family history of Emphysema  Sister    3   Family history of Hypertension (V17 49)  Family History    4  Family history of Does not use illicit drugs   5  No family history of mental disorder    The family history was reviewed and updated today  Social History    · Brushes teeth daily   · Copy of advanced directive obtained (V49 89) (Z78 9)   · Does not exercise (V69 0) (Z72 3)   · Does not use illicit drugs (C88 00) (Z78 9)   · Never A Smoker   · No alcohol use  The social history was reviewed and updated today  Current Meds   1  Acetaminophen 500 MG Oral Capsule; TAKE 2 CAPSULES every 8 hrs for back pain/ HA;   not to exceed 6 tabs in a day; Therapy: 59LCX8145 to Recorded   2  Caltrate 600 TABS; TAKE 1 TABLET DAILY; Therapy: (Recorded:78Rlm6722) to Recorded   3  Fish Oil 1000 MG Oral Capsule; TAKE 2 CAPSULE Daily; Therapy: (Recorded:90Jaq4875) to Recorded   4  Fluticasone Propionate 50 MCG/ACT Nasal Suspension; USE 2 SPRAYS IN EACH   NOSTRIL ONCE DAILY; Therapy: 28MUN1734 to (Last Rx:24Ugv3391)  Requested for: 78Lbw3579 Ordered   5  Glucosamine Chondroitin Complx Oral Capsule; TAKE 1 CAPSULE DAILY; Therapy: (Recorded:30Qro9468) to Recorded   6  HydroCHLOROthiazide 12 5 MG Oral Capsule; TAKE 1 CAPSULE ONE TIME DAILY; Therapy: 72QHS9104 to (Evaluate:19Kdx3497)  Requested for: 15RMP2729; Last   Rx:19Cbg3992 Ordered   7  Ketorolac Tromethamine 0 4 % Ophthalmic Solution; Therapy: 49Sbs2317 to Recorded   8  Lisinopril 40 MG Oral Tablet; Take 1 tablet daily; Therapy: 17Awa7571 to (Evaluate:06Ynt2638)  Requested for: 44CKJ4284; Last   Rx:06Syd4754 Ordered   9  Multi Vitamin Daily Oral Tablet; Take 1 daily; Therapy: 34MMG0913 to Recorded   10  TraMADol HCl - 50 MG Oral Tablet; TAKE 1 TABLET EVERY 12 HOURS AS NEEDED; Therapy: 12FIE2733 to (Alisa Arizmendi)  Requested for: 89TLQ0432; Last    Rx:76Epq1391 Ordered   11  Vitamin C-Quercetin-Citrus Bio [phone] MG Oral Capsule; Therapy: 28Fcd3493 to Recorded   12   Vitamin D 2000 UNIT Oral Capsule; Take 1 tablet daily; Therapy: (Recorded:30Owf6279) to Recorded    The medication list was reviewed and updated today  Allergies    1  Aceon TABS   2  Norvasc TABS    Immunizations  Influenza --- Denise Coffer: 29-Nov-2002; Brie Housert: 60-HMX-6938; Jeremy Duck: 55-Cil-8301Netbd Clink:  15-Sep-2014; Series5: 12-Oct-2015; Series6: 19-Oct-2016   PPSV --- Series1: 29-Nov-2002; Series2: 28-Oct-2015   Pneumo Other --- Denise Coffer: 29-Nov-2012   Tdap --- Denise Coffer: Temporarily Deferred: Pt refuses, As of: 11Apr2016, Defer for 1 Years     Vitals  Signs   Recorded: 02Mar2017 10:53AM   Temperature: 98 9 F  Heart Rate: 80  Systolic: 346  Diastolic: 60  Height: 4 ft 10 in  Weight: 148 lb 6 08 oz  BMI Calculated: 31 01  BSA Calculated: 1 6  O2 Saturation: 92    Results/Data  *VB - Fall Risk Assessment  (Dx Z13 89 Screen for Neurologic Disorder) 23ZQJ4924 12:00AM Aman Suhail     Test Name Result Flag Reference   Falls Risk      No falls in the past year     (1) COMPREHENSIVE METABOLIC PANEL 06DOC5182 83:39IQ Aman Suhail   TW Order Number: [id number]     Test Name Result Flag Reference   GLUCOSE,RANDM 94 mg/dL     If the patient is fasting, the ADA then defines impaired fasting glucose as > 100 mg/dL and diabetes as > or equal to 123 mg/dL  SODIUM 138 mmol/L  136-145   POTASSIUM 4 7 mmol/L  3 5-5 3   CHLORIDE 102 mmol/L  100-108   CARBON DIOXIDE 29 mmol/L  21-32   ANION GAP (CALC) 7 mmol/L  4-13   BLOOD UREA NITROGEN 24 mg/dL  5-25   CREATININE 1 20 mg/dL  0 60-1 30   Standardized to IDMS reference method   CALCIUM 9 4 mg/dL  8 3-10 1   BILI, TOTAL 0 50 mg/dL  0 20-1 00   ALK PHOSPHATAS 71 U/L     ALT (SGPT) 20 U/L  12-78   AST(SGOT) 12 U/L  5-45   ALBUMIN 3 5 g/dL  3 5-5 0   TOTAL PROTEIN 8 5 g/dL H 6 4-8 2   eGFR Non-African American 42 1 ml/min/1 73sq m     - Patient Instructions:  This is a fasting blood test  Water,black tea or black  coffee only after 9:00pm the night before test Drink 2 glasses of water the morning of test   National Kidney Disease Education Program recommendations are as follows:  GFR calculation is accurate only with a steady state creatinine  Chronic Kidney disease less than 60 ml/min/1 73 sq  meters  Kidney failure less than 15 ml/min/1 73 sq  meters  (1) CBC/ PLT (NO DIFF) [id number] 09:46AM ReserveOut    Order Number: [id number]     Test Name Result Flag Reference   HEMATOCRIT 41 1 %  34 8-46 1   HEMOGLOBIN 13 9 g/dL  11 5-15 4   MCHC 33 8 g/dL  31 4-37 4   MCH 29 5 pg  26 8-34 3   MCV 87 fL  82-98   PLATELET COUNT 969 Thousands/uL  149-390   RBC COUNT 4 71 Million/uL  3 81-5 12   RDW 12 9 %  11 6-15 1   WBC COUNT 7 31 Thousand/uL  4 31-10 16   MPV 11 4 fL  8 9-12 7     (1) LIPID PANEL, FASTING 77Uib3834 09:46AM ReserveOut   TW Order Number: [id number]     Test Name Result Flag Reference   CHOLESTEROL 233 mg/dL H    HDL,DIRECT 76 mg/dL H 40-60   Specimen collection should occur prior to Metamizole administration due to the potential for falsely depressed results  LDL CHOLESTEROL CALCULATED 135 mg/dL H 0-100   - Patient Instructions: This is a fasting blood test  Water,black tea or black  coffee only after 9:00pm the night before test   Drink 2 glasses of water the morning of test     - Patient Instructions: This is a fasting blood test  Water,black tea or black  coffee only after 9:00pm the night before test Drink 2 glasses of water the morning of test   Triglyceride:         Normal              <150 mg/dl       Borderline High    150-199 mg/dl       High               200-499 mg/dl       Very High          >499 mg/dl  Cholesterol:         Desirable        <200 mg/dl      Borderline High  200-239 mg/dl      High             >239 mg/dl  HDL Cholesterol:        High    >59 mg/dL      Low     <41 mg/dL  LDL CALCULATED:    This screening LDL is a calculated result    It does not have the accuracy of the Direct Measured LDL in the monitoring of patients with hyperlipidemia and/or statin therapy  Direct Measure LDL (JIL443) must be ordered separately in these patients  TRIGLYCERIDES 112 mg/dL  <=150   Specimen collection should occur prior to N-Acetylcysteine or Metamizole administration due to the potential for falsely depressed results  Procedure    Procedure: cerumen removal    Indication: tympanic membrane(s) could not be visualized in both ears  Procedure Note: The procedure was performed by the Provider and lasted 10 minute(s)  A otoscope was placed in the ear canal(s) to visualize the ear canal debris  The ear was cleaned by using a curette  The procedure was partially successful  Post-Procedure:   Patient Status: the patient tolerated the procedure well  Complications: there were no complications  Patient instructions: dry ear precautions and avoid using q-tips  Follow-up as needed and rt impaction removed /left ear still has impaction  Health Management  Encounter for screening for other nervous system disorder   *VB - Fall Risk Assessment  (Dx Z13 89 Screen for Neurologic Disorder); every 1 year; Last  74IRU0036; Next Due: 23VML4336; Active  Encounter for special screening examination for genitourinary disorder   *VB - Urinary Incontinence Screen (Dx Z13 89 Screen for UI); every 1 year; Last 99XFR5260; Next  Due: 68OXY3127; Active  Influenza vaccine needed   Fluzone High-Dose Intramuscular Suspension; every 1 year; Last 26FYN7869; Next Due: 78ZRK1347; Overdue  Osteoarthritis   * Dexa Scan Axial Skel (Hip, Pelvis, Spine); every 2 years; Next Due: 66WYP5400; Overdue    Attending Note  Collaborating Physician Note: Collaborating Note: I did not interview and examine the patient and I agree with the Advanced Practitioner note        Future Appointments    Date/Time Provider Specialty Site   08/03/2017 10:30 AM Jaycee Byers 55     Signatures   Electronically signed by : Venancio Hillman Rashard Bianchi; Mar  2 2017 12:22PM EST                       (Author)    Electronically signed by :  Narayan Clancy MD; Mar  2 2017 12:35PM EST                       (Author)

## 2018-01-12 VITALS
DIASTOLIC BLOOD PRESSURE: 60 MMHG | WEIGHT: 148.38 LBS | HEIGHT: 58 IN | TEMPERATURE: 98.9 F | OXYGEN SATURATION: 92 % | HEART RATE: 80 BPM | SYSTOLIC BLOOD PRESSURE: 140 MMHG | BODY MASS INDEX: 31.14 KG/M2

## 2018-01-12 VITALS
TEMPERATURE: 99 F | HEIGHT: 57 IN | DIASTOLIC BLOOD PRESSURE: 64 MMHG | WEIGHT: 149 LBS | OXYGEN SATURATION: 95 % | HEART RATE: 79 BPM | SYSTOLIC BLOOD PRESSURE: 146 MMHG | BODY MASS INDEX: 32.15 KG/M2

## 2018-01-14 VITALS
WEIGHT: 153.4 LBS | RESPIRATION RATE: 18 BRPM | DIASTOLIC BLOOD PRESSURE: 62 MMHG | HEIGHT: 58 IN | SYSTOLIC BLOOD PRESSURE: 168 MMHG | HEART RATE: 78 BPM | OXYGEN SATURATION: 93 % | TEMPERATURE: 98.3 F | BODY MASS INDEX: 32.2 KG/M2

## 2018-01-14 VITALS
HEART RATE: 85 BPM | RESPIRATION RATE: 20 BRPM | BODY MASS INDEX: 31.38 KG/M2 | DIASTOLIC BLOOD PRESSURE: 60 MMHG | TEMPERATURE: 97.7 F | OXYGEN SATURATION: 98 % | WEIGHT: 149.5 LBS | HEIGHT: 58 IN | SYSTOLIC BLOOD PRESSURE: 143 MMHG

## 2018-01-15 NOTE — RESULT NOTES
Message   kidney function has improved on inc water intake - cont to drink water 4-6 glasses daily  See you in OCT  Verified Results  (1) BASIC METABOLIC PROFILE 55JPV0279 09:14AM Juan J Culver Order Number: [id number]     Test Name Result Flag Reference   GLUCOSE,RANDM 96 mg/dL     If the patient is fasting, the ADA then defines impaired fasting glucose as > 100 mg/dL and diabetes as > or equal to 123 mg/dL  SODIUM 138 mmol/L  136-145   POTASSIUM 5 0 mmol/L  3 5-5 3   CHLORIDE 103 mmol/L  100-108   CARBON DIOXIDE 28 mmol/L  21-32   ANION GAP (CALC) 7 mmol/L  4-13   BLOOD UREA NITROGEN 29 mg/dL H 5-25   CREATININE 1 24 mg/dL  0 60-1 30   Standardized to IDMS reference method   CALCIUM 9 3 mg/dL  8 3-10 1   eGFR Non-African American 40 5 ml/min/1 73sq m     [facility] [employer] Disease Education Program recommendations are as follows:  GFR calculation is accurate only with a steady state creatinine  Chronic Kidney disease less than 60 ml/min/1 73 sq  meters  Kidney failure less than 15 ml/min/1 73 sq  meters

## 2018-01-16 NOTE — PROGRESS NOTES
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)   9  Head ache (784 0) (R51)   10  Hypertension (401 9) (I10)   11  Influenza vaccine needed (V04 81) (Z23)   12  History of Intermittent claudication (443 9) (I73 9)   13  Medicare annual wellness visit, subsequent (V70 0) (Z00 00)   14  Nonintractable episodic headache, unspecified headache type (784 0) (R51)   15  Osteoarthritis (715 90) (M19 90)   16  Pain management contract agreement   17  Renal insufficiency (593 9) (N28 9)   18  Screening for osteoporosis (V82 81) (Z13 820)   19  Seasonal allergies (477 9) (J30 2)   20  Visit for screening mammogram (V76 12) (Z12 31)    Current Meds   1  Acetaminophen 500 MG Oral Capsule; TAKE 2 CAPSULES every 8 hrs for back pain/   HA; not to exceed 6 tabs in a day; Therapy: 99LKP9353 to Recorded   2  Calcium 500 MG TABS; Therapy: 00Jnc1986 to Recorded   3  Caltrate 600 TABS; TAKE 1 TABLET DAILY; Therapy: (Recorded:24Ldr9525) to Recorded   4  Fish Oil 1000 MG Oral Capsule; TAKE 2 CAPSULE Daily; Therapy: (Recorded:40Ujd9128) to Recorded   5  Fluticasone Propionate 50 MCG/ACT Nasal Suspension; USE 2 SPRAYS IN EACH   NOSTRIL ONCE DAILY; Therapy: 54ZAT7016 to (Last Rx:14Oct2016)  Requested for: 14Oct2016 Ordered   6  Glucosamine Chondroitin Complx Oral Capsule; TAKE 1 CAPSULE DAILY; Therapy: (Recorded:66Vdt6921) to Recorded   7   HydroCHLOROthiazide 12 5 MG Oral Capsule; take 1 capsule by mouth once daily; Therapy: 78KLU6908 to (Evaluate:62Zca4156)  Requested for: 44Oxt4876; Last   Rx:77Mjp7744 Ordered   8  Ketorolac Tromethamine 0 4 % Ophthalmic Solution; Therapy: 37Gzs1312 to Recorded   9  Lisinopril 40 MG Oral Tablet; Take 1 tablet daily; Therapy: 87Wwh3127 to (Evaluate:99Chw7584)  Requested for: 83WDJ6016; Last   Rx:86Zsw9519 Ordered   10  Multi Vitamin Daily Oral Tablet; Take 1 daily; Therapy: 90JKK0311 to Recorded   11  TraMADol HCl - 50 MG Oral Tablet; TAKE 1 TABLET EVERY 12 HOURS AS NEEDED; Therapy: 53KKL5487 to (Nayeli Yadav)  Requested for: 36Qcm6164; Last    Rx:53Pze1091 Ordered   12  Vitamin C-Quercetin-Citrus Bio [phone] MG Oral Capsule; Therapy: 67Efd4306 to Recorded   13  Vitamin D 2000 UNIT Oral Capsule; Take 1 tablet daily; Therapy: (Recorded:80Fbv5243) to Recorded    Allergies    1  Aceon TABS   2  Norvasc TABS    Assessment    1   Influenza vaccine needed (V04 81) (Z23)    Plan  Influenza vaccine needed    · Fluzone High-Dose 0 5 ML Intramuscular Suspension Prefilled Syringe    Future Appointments    Date/Time Provider Specialty Site   03/02/2017 11:00 AM Jaycee Syed 55     Signatures   Electronically signed by : NAMAN Loya ; Oct 20 2016  1:02PM EST                       (Author)

## 2018-01-17 NOTE — RESULT NOTES
Verified Results  (1) BASIC METABOLIC PROFILE 34ZYU9487 10:35AM Zofia Rosario    Order Number: [id number]     Test Name Result Flag Reference   GLUCOSE,RANDM 86 mg/dL     If the patient is fasting, the ADA then defines impaired fasting glucose as > 100 mg/dL and diabetes as > or equal to 123 mg/dL  Specimen collection should occur prior to Sulfasalazine administration due to the potential for falsely depressed results  Specimen collection should occur prior to Sulfapyridine administration due to the potential for falsely elevated results  SODIUM 136 mmol/L  136-145   POTASSIUM 4 4 mmol/L  3 5-5 3   CHLORIDE 102 mmol/L  100-108   CARBON DIOXIDE 28 mmol/L  21-32   ANION GAP (CALC) 6 mmol/L  4-13   BLOOD UREA NITROGEN 21 mg/dL  5-25   CREATININE 1 02 mg/dL  0 60-1 30   Standardized to IDMS reference method   CALCIUM 9 3 mg/dL  8 3-10 1   eGFR 48 ml/min/1 73sq m     [facility] Disease Education Program recommendations are as follows:  GFR calculation is accurate only with a steady state creatinine  Chronic Kidney disease less than 60 ml/min/1 73 sq  meters  Kidney failure less than 15 ml/min/1 73 sq  meters

## 2018-02-05 DIAGNOSIS — E78.5 HYPERLIPIDEMIA: ICD-10-CM

## 2018-02-05 DIAGNOSIS — N18.30 CHRONIC KIDNEY DISEASE, STAGE III (MODERATE) (HCC): ICD-10-CM

## 2018-02-05 DIAGNOSIS — R54 AGE-RELATED PHYSICAL DEBILITY: ICD-10-CM

## 2018-02-05 DIAGNOSIS — I10 ESSENTIAL (PRIMARY) HYPERTENSION: ICD-10-CM

## 2018-02-05 DIAGNOSIS — J30.2 OTHER SEASONAL ALLERGIC RHINITIS: ICD-10-CM

## 2018-02-13 ENCOUNTER — APPOINTMENT (OUTPATIENT)
Dept: LAB | Facility: HOSPITAL | Age: 83
End: 2018-02-13
Payer: MEDICARE

## 2018-02-13 DIAGNOSIS — I10 ESSENTIAL (PRIMARY) HYPERTENSION: ICD-10-CM

## 2018-02-13 DIAGNOSIS — E78.5 HYPERLIPIDEMIA: ICD-10-CM

## 2018-02-13 DIAGNOSIS — J30.2 OTHER SEASONAL ALLERGIC RHINITIS: ICD-10-CM

## 2018-02-13 DIAGNOSIS — N18.30 CHRONIC KIDNEY DISEASE, STAGE III (MODERATE) (HCC): ICD-10-CM

## 2018-02-13 DIAGNOSIS — R54 AGE-RELATED PHYSICAL DEBILITY: ICD-10-CM

## 2018-02-13 LAB
ALBUMIN SERPL BCP-MCNC: 3.6 G/DL (ref 3.5–5)
ALP SERPL-CCNC: 75 U/L (ref 46–116)
ALT SERPL W P-5'-P-CCNC: 23 U/L (ref 12–78)
ANION GAP SERPL CALCULATED.3IONS-SCNC: 7 MMOL/L (ref 4–13)
AST SERPL W P-5'-P-CCNC: 14 U/L (ref 5–45)
BASOPHILS # BLD AUTO: 0.02 THOUSANDS/ΜL (ref 0–0.1)
BASOPHILS NFR BLD AUTO: 0 % (ref 0–1)
BILIRUB SERPL-MCNC: 0.46 MG/DL (ref 0.2–1)
BUN SERPL-MCNC: 19 MG/DL (ref 5–25)
CALCIUM SERPL-MCNC: 9 MG/DL (ref 8.3–10.1)
CHLORIDE SERPL-SCNC: 102 MMOL/L (ref 100–108)
CHOLEST SERPL-MCNC: 204 MG/DL (ref 50–200)
CO2 SERPL-SCNC: 29 MMOL/L (ref 21–32)
CREAT SERPL-MCNC: 1.1 MG/DL (ref 0.6–1.3)
EOSINOPHIL # BLD AUTO: 0.24 THOUSAND/ΜL (ref 0–0.61)
EOSINOPHIL NFR BLD AUTO: 3 % (ref 0–6)
ERYTHROCYTE [DISTWIDTH] IN BLOOD BY AUTOMATED COUNT: 12.7 % (ref 11.6–15.1)
GFR SERPL CREATININE-BSD FRML MDRD: 44 ML/MIN/1.73SQ M
GLUCOSE P FAST SERPL-MCNC: 102 MG/DL (ref 65–99)
HCT VFR BLD AUTO: 38.6 % (ref 34.8–46.1)
HDLC SERPL-MCNC: 70 MG/DL (ref 40–60)
HGB BLD-MCNC: 13.3 G/DL (ref 11.5–15.4)
LDLC SERPL CALC-MCNC: 108 MG/DL (ref 0–100)
LYMPHOCYTES # BLD AUTO: 1.92 THOUSANDS/ΜL (ref 0.6–4.47)
LYMPHOCYTES NFR BLD AUTO: 26 % (ref 14–44)
MCH RBC QN AUTO: 30.2 PG (ref 26.8–34.3)
MCHC RBC AUTO-ENTMCNC: 34.5 G/DL (ref 31.4–37.4)
MCV RBC AUTO: 88 FL (ref 82–98)
MONOCYTES # BLD AUTO: 0.41 THOUSAND/ΜL (ref 0.17–1.22)
MONOCYTES NFR BLD AUTO: 6 % (ref 4–12)
NEUTROPHILS # BLD AUTO: 4.79 THOUSANDS/ΜL (ref 1.85–7.62)
NEUTS SEG NFR BLD AUTO: 65 % (ref 43–75)
NRBC BLD AUTO-RTO: 0 /100 WBCS
PLATELET # BLD AUTO: 198 THOUSANDS/UL (ref 149–390)
PMV BLD AUTO: 11.8 FL (ref 8.9–12.7)
POTASSIUM SERPL-SCNC: 4.6 MMOL/L (ref 3.5–5.3)
PROT SERPL-MCNC: 8.4 G/DL (ref 6.4–8.2)
RBC # BLD AUTO: 4.41 MILLION/UL (ref 3.81–5.12)
SODIUM SERPL-SCNC: 138 MMOL/L (ref 136–145)
T4 FREE SERPL-MCNC: 1 NG/DL (ref 0.76–1.46)
TRIGL SERPL-MCNC: 132 MG/DL
TSH SERPL DL<=0.05 MIU/L-ACNC: 3.81 UIU/ML (ref 0.36–3.74)
WBC # BLD AUTO: 7.4 THOUSAND/UL (ref 4.31–10.16)

## 2018-02-13 PROCEDURE — 84439 ASSAY OF FREE THYROXINE: CPT

## 2018-02-13 PROCEDURE — 80053 COMPREHEN METABOLIC PANEL: CPT

## 2018-02-13 PROCEDURE — 84443 ASSAY THYROID STIM HORMONE: CPT

## 2018-02-13 PROCEDURE — 80061 LIPID PANEL: CPT

## 2018-02-13 PROCEDURE — 36415 COLL VENOUS BLD VENIPUNCTURE: CPT

## 2018-02-13 PROCEDURE — 85025 COMPLETE CBC W/AUTO DIFF WBC: CPT

## 2018-02-19 ENCOUNTER — OFFICE VISIT (OUTPATIENT)
Dept: OBGYN CLINIC | Facility: HOSPITAL | Age: 83
End: 2018-02-19
Payer: MEDICARE

## 2018-02-20 ENCOUNTER — OFFICE VISIT (OUTPATIENT)
Dept: OBGYN CLINIC | Facility: HOSPITAL | Age: 83
End: 2018-02-20
Payer: MEDICARE

## 2018-02-21 DIAGNOSIS — I10 ESSENTIAL HYPERTENSION: Primary | ICD-10-CM

## 2018-02-22 ENCOUNTER — OFFICE VISIT (OUTPATIENT)
Dept: FAMILY MEDICINE CLINIC | Facility: CLINIC | Age: 83
End: 2018-02-22
Payer: MEDICARE

## 2018-02-22 VITALS
DIASTOLIC BLOOD PRESSURE: 68 MMHG | HEART RATE: 79 BPM | TEMPERATURE: 98 F | WEIGHT: 153 LBS | RESPIRATION RATE: 20 BRPM | HEIGHT: 57 IN | BODY MASS INDEX: 33.01 KG/M2 | SYSTOLIC BLOOD PRESSURE: 132 MMHG | OXYGEN SATURATION: 95 %

## 2018-02-22 DIAGNOSIS — J30.2 SEASONAL ALLERGIC RHINITIS, UNSPECIFIED CHRONICITY, UNSPECIFIED TRIGGER: ICD-10-CM

## 2018-02-22 DIAGNOSIS — N18.30 CKD (CHRONIC KIDNEY DISEASE), STAGE III (HCC): ICD-10-CM

## 2018-02-22 DIAGNOSIS — E78.5 DYSLIPIDEMIA: ICD-10-CM

## 2018-02-22 DIAGNOSIS — M17.0 OSTEOARTHRITIS OF BOTH KNEES, UNSPECIFIED OSTEOARTHRITIS TYPE: ICD-10-CM

## 2018-02-22 DIAGNOSIS — R54 FRAIL ELDERLY: ICD-10-CM

## 2018-02-22 DIAGNOSIS — H61.21 IMPACTED CERUMEN OF RIGHT EAR: ICD-10-CM

## 2018-02-22 DIAGNOSIS — I10 ESSENTIAL HYPERTENSION: Primary | ICD-10-CM

## 2018-02-22 PROCEDURE — 69210 REMOVE IMPACTED EAR WAX UNI: CPT | Performed by: NURSE PRACTITIONER

## 2018-02-22 PROCEDURE — 99214 OFFICE O/P EST MOD 30 MIN: CPT | Performed by: NURSE PRACTITIONER

## 2018-02-22 RX ORDER — LISINOPRIL 40 MG/1
0.5 TABLET ORAL DAILY
COMMUNITY
Start: 2014-09-15 | End: 2018-07-09 | Stop reason: SDUPTHER

## 2018-02-22 RX ORDER — TRAMADOL HYDROCHLORIDE 50 MG/1
1 TABLET ORAL EVERY 12 HOURS PRN
COMMUNITY
Start: 2013-08-05 | End: 2018-02-28 | Stop reason: SDUPTHER

## 2018-02-22 RX ORDER — MULTIVIT-MIN/IRON/FOLIC ACID/K 18-600-40
1 CAPSULE ORAL DAILY
COMMUNITY

## 2018-02-22 RX ORDER — AMLODIPINE BESYLATE 2.5 MG/1
1 TABLET ORAL DAILY
COMMUNITY
Start: 2017-08-03 | End: 2018-08-21 | Stop reason: ALTCHOICE

## 2018-02-22 RX ORDER — HYDROCHLOROTHIAZIDE 12.5 MG/1
1 CAPSULE, GELATIN COATED ORAL DAILY
COMMUNITY
Start: 2012-06-28 | End: 2018-04-24 | Stop reason: SDUPTHER

## 2018-02-22 RX ORDER — AMLODIPINE BESYLATE 2.5 MG/1
TABLET ORAL
Qty: 90 TABLET | Refills: 1 | Status: SHIPPED | OUTPATIENT
Start: 2018-02-22 | End: 2018-08-21 | Stop reason: SDUPTHER

## 2018-02-22 RX ORDER — CHLORAL HYDRATE 500 MG
2 CAPSULE ORAL DAILY
COMMUNITY

## 2018-02-22 NOTE — PROGRESS NOTES
Assessment/Plan:  Chronic conditions stable  Continue current regimen  Follow up in 6 mos w/awv  Improved creat and GFR w/ adjunct of low dose CCB and dec dose of lisinopril       Diagnoses and all orders for this visit:    Essential hypertension    Seasonal allergic rhinitis, unspecified chronicity, unspecified trigger    CKD (chronic kidney disease), stage III    Osteoarthritis of both knees, unspecified osteoarthritis type    Dyslipidemia    Frail elderly    Other orders  -     Calcium Carbonate (CALTRATE 600) 1500 (600 Ca) MG TABS; Take 1 tablet by mouth daily  -     Omega-3 Fatty Acids (FISH OIL) 1,000 mg; Take 2 capsules by mouth daily  -     GLUCOSAMINE CHONDROITIN COMPLX PO; Take 1 capsule by mouth daily  -     hydrochlorothiazide (MICROZIDE) 12 5 mg capsule; Take 1 capsule by mouth daily  -     lisinopril (ZESTRIL) 40 mg tablet; Take 0 5 tablets by mouth daily  -     Multiple Vitamin (MULTI-VITAMIN DAILY PO); Take by mouth daily  -     traMADol (ULTRAM) 50 mg tablet; Take 1 tablet by mouth every 12 (twelve) hours as needed  -     Vit B-Suyyzbm-Uywxhk-Bromelain (VITAMIN C-QUERCETIN-CITRUS BIO) 396-438-570-50 MG CAPS; Take by mouth  -     Cholecalciferol (VITAMIN D) 2000 units CAPS; Take 1 tablet by mouth daily  -     amLODIPine (NORVASC) 2 5 mg tablet; Take 1 tablet by mouth daily          Subjective:   Pt here for a 6 month follow up  Pt has no new issues to discuss today  Labs done 2/13/18  depression scale negative  Needs awv this summer       Patient ID: Liz Villegas is a 80 y o  female  Liz Villegas 80 y o  6 month chronic dz w/ labs; here w/ her daughter  Labs reviewed and stable; improved  Meds reviewed - verified  12 point ROS neg - doing well - denies colds/ falls/ dizziness/ cp/ sob/ cough      HM:  Flzone/Pneum vaccines UTD        The following portions of the patient's history were reviewed and updated as appropriate: allergies, current medications, past family history and problem list     Review of Systems   Constitutional: Negative for activity change and appetite change  HENT: Negative  Cerumen RT ear   Eyes: Negative  Respiratory: Negative  Cardiovascular: Negative  Negative for chest pain  Gastrointestinal: Negative  Endocrine: Negative  Negative for cold intolerance  Genitourinary: Negative  Musculoskeletal: Negative  Skin: Negative  Allergic/Immunologic: Negative  Neurological: Negative  Hematological: Negative  Psychiatric/Behavioral: Negative  All other systems reviewed and are negative  Objective:    Vitals:    02/22/18 0944   BP: 132/68   Pulse: 79   Resp: 20   Temp: 98 °F (36 7 °C)   SpO2: 95%   Weight: 69 4 kg (153 lb)   Height: 4' 8 69" (1 44 m)       LABS reviewed :CMP creat 1 10 BUN 19 - improved/ LIPID HDL 70 and  - improved  Ear cerumen removal  Date/Time: 2/22/2018 10:20 AM  Performed by: Doris Belle  Authorized by: Doris Belle     Patient location:  Clinic  Other Assisting Provider: No    Consent:     Consent obtained:  Verbal    Consent given by:  Patient    Risks discussed:  Infection, pain and TM perforation    Alternatives discussed:  Alternative treatment  Universal protocol:     Procedure explained and questions answered to patient or proxy's satisfaction: yes      Patient identity confirmed:  Verbally with patient  Procedure details:     Local anesthetic:  None    Location:  R ear    Procedure type: curette      Approach:  Natural orifice  Post-procedure details:     Complication:  None    Hearing quality:  Improved    Patient tolerance of procedure: Tolerated well, no immediate complications         Physical Exam   Constitutional: She is oriented to person, place, and time  She appears well-developed and well-nourished  HENT:   Head: Normocephalic  Right Ear: External ear normal  Decreased hearing (cerumen impaction) is noted     Left Ear: Tympanic membrane and external ear normal  Mouth/Throat: Oropharynx is clear and moist    Eyes: Conjunctivae are normal  Pupils are equal, round, and reactive to light  Neck: Normal range of motion  Neck supple  No thyromegaly present  Cardiovascular: Normal rate, regular rhythm, normal heart sounds and intact distal pulses  No murmur heard  Pulmonary/Chest: Effort normal and breath sounds normal  No respiratory distress  Abdominal: Soft  Bowel sounds are normal    Musculoskeletal: Normal range of motion  Neurological: She is alert and oriented to person, place, and time  She has normal reflexes  Skin: Skin is warm and dry  Psychiatric: She has a normal mood and affect   Her behavior is normal  Judgment and thought content normal

## 2018-02-28 DIAGNOSIS — M19.90 OSTEOARTHRITIS, UNSPECIFIED OSTEOARTHRITIS TYPE, UNSPECIFIED SITE: Primary | ICD-10-CM

## 2018-02-28 DIAGNOSIS — M54.2 CERVICALGIA: ICD-10-CM

## 2018-03-01 RX ORDER — TRAMADOL HYDROCHLORIDE 50 MG/1
50 TABLET ORAL EVERY 12 HOURS PRN
Qty: 60 TABLET | Refills: 0 | OUTPATIENT
Start: 2018-03-01 | End: 2018-04-30 | Stop reason: SDUPTHER

## 2018-03-02 DIAGNOSIS — M54.2 CERVICALGIA: ICD-10-CM

## 2018-03-02 DIAGNOSIS — M19.90 OSTEOARTHRITIS, UNSPECIFIED OSTEOARTHRITIS TYPE, UNSPECIFIED SITE: ICD-10-CM

## 2018-03-09 DIAGNOSIS — M19.90 OSTEOARTHRITIS, UNSPECIFIED OSTEOARTHRITIS TYPE, UNSPECIFIED SITE: ICD-10-CM

## 2018-03-09 DIAGNOSIS — M54.2 CERVICALGIA: ICD-10-CM

## 2018-03-09 RX ORDER — TRAMADOL HYDROCHLORIDE 50 MG/1
TABLET ORAL
Qty: 180 TABLET | Refills: 0 | OUTPATIENT
Start: 2018-03-09

## 2018-04-24 DIAGNOSIS — I10 HYPERTENSION, UNSPECIFIED TYPE: Primary | ICD-10-CM

## 2018-04-24 RX ORDER — HYDROCHLOROTHIAZIDE 12.5 MG/1
12.5 CAPSULE, GELATIN COATED ORAL DAILY
Qty: 90 CAPSULE | Refills: 1 | Status: SHIPPED | OUTPATIENT
Start: 2018-04-24 | End: 2018-09-10 | Stop reason: SDUPTHER

## 2018-04-30 DIAGNOSIS — M19.90 OSTEOARTHRITIS, UNSPECIFIED OSTEOARTHRITIS TYPE, UNSPECIFIED SITE: ICD-10-CM

## 2018-04-30 DIAGNOSIS — M54.2 CERVICALGIA: ICD-10-CM

## 2018-05-01 RX ORDER — TRAMADOL HYDROCHLORIDE 50 MG/1
50 TABLET ORAL EVERY 12 HOURS PRN
Qty: 60 TABLET | Refills: 3 | Status: SHIPPED | OUTPATIENT
Start: 2018-05-01 | End: 2018-08-21 | Stop reason: SDUPTHER

## 2018-05-08 ENCOUNTER — OFFICE VISIT (OUTPATIENT)
Dept: OBGYN CLINIC | Facility: HOSPITAL | Age: 83
End: 2018-05-08
Payer: MEDICARE

## 2018-05-09 ENCOUNTER — OFFICE VISIT (OUTPATIENT)
Dept: OBGYN CLINIC | Facility: HOSPITAL | Age: 83
End: 2018-05-09
Payer: MEDICARE

## 2018-07-09 DIAGNOSIS — I10 HYPERTENSION, UNSPECIFIED TYPE: Primary | ICD-10-CM

## 2018-07-09 RX ORDER — LISINOPRIL 20 MG/1
20 TABLET ORAL DAILY
Qty: 90 TABLET | Refills: 1 | Status: SHIPPED | OUTPATIENT
Start: 2018-07-09 | End: 2018-12-31 | Stop reason: SDUPTHER

## 2018-07-23 ENCOUNTER — OFFICE VISIT (OUTPATIENT)
Dept: OBGYN CLINIC | Facility: HOSPITAL | Age: 83
End: 2018-07-23
Payer: MEDICARE

## 2018-07-24 ENCOUNTER — OFFICE VISIT (OUTPATIENT)
Dept: OBGYN CLINIC | Facility: HOSPITAL | Age: 83
End: 2018-07-24
Payer: MEDICARE

## 2018-08-14 ENCOUNTER — TRANSCRIBE ORDERS (OUTPATIENT)
Dept: LAB | Facility: HOSPITAL | Age: 83
End: 2018-08-14

## 2018-08-14 ENCOUNTER — APPOINTMENT (OUTPATIENT)
Dept: LAB | Facility: HOSPITAL | Age: 83
End: 2018-08-14
Payer: MEDICARE

## 2018-08-14 DIAGNOSIS — N18.30 CKD (CHRONIC KIDNEY DISEASE), STAGE III (HCC): ICD-10-CM

## 2018-08-14 DIAGNOSIS — I10 ESSENTIAL HYPERTENSION: ICD-10-CM

## 2018-08-14 DIAGNOSIS — E78.5 DYSLIPIDEMIA: ICD-10-CM

## 2018-08-14 LAB
ALBUMIN SERPL BCP-MCNC: 3.4 G/DL (ref 3.5–5)
ALP SERPL-CCNC: 63 U/L (ref 46–116)
ALT SERPL W P-5'-P-CCNC: 18 U/L (ref 12–78)
ANION GAP SERPL CALCULATED.3IONS-SCNC: 8 MMOL/L (ref 4–13)
AST SERPL W P-5'-P-CCNC: 13 U/L (ref 5–45)
BILIRUB SERPL-MCNC: 0.53 MG/DL (ref 0.2–1)
BUN SERPL-MCNC: 20 MG/DL (ref 5–25)
CALCIUM SERPL-MCNC: 8.9 MG/DL (ref 8.3–10.1)
CHLORIDE SERPL-SCNC: 105 MMOL/L (ref 100–108)
CHOLEST SERPL-MCNC: 183 MG/DL (ref 50–200)
CO2 SERPL-SCNC: 25 MMOL/L (ref 21–32)
CREAT SERPL-MCNC: 1.15 MG/DL (ref 0.6–1.3)
GFR SERPL CREATININE-BSD FRML MDRD: 41 ML/MIN/1.73SQ M
GLUCOSE P FAST SERPL-MCNC: 99 MG/DL (ref 65–99)
HDLC SERPL-MCNC: 62 MG/DL (ref 40–60)
LDLC SERPL CALC-MCNC: 107 MG/DL (ref 0–100)
NONHDLC SERPL-MCNC: 121 MG/DL
POTASSIUM SERPL-SCNC: 4.3 MMOL/L (ref 3.5–5.3)
PROT SERPL-MCNC: 7.8 G/DL (ref 6.4–8.2)
SODIUM SERPL-SCNC: 138 MMOL/L (ref 136–145)
TRIGL SERPL-MCNC: 68 MG/DL
TSH SERPL DL<=0.05 MIU/L-ACNC: 3.52 UIU/ML (ref 0.36–3.74)

## 2018-08-14 PROCEDURE — 36415 COLL VENOUS BLD VENIPUNCTURE: CPT

## 2018-08-14 PROCEDURE — 80053 COMPREHEN METABOLIC PANEL: CPT

## 2018-08-14 PROCEDURE — 84443 ASSAY THYROID STIM HORMONE: CPT

## 2018-08-14 PROCEDURE — 80061 LIPID PANEL: CPT

## 2018-08-21 ENCOUNTER — OFFICE VISIT (OUTPATIENT)
Dept: FAMILY MEDICINE CLINIC | Facility: CLINIC | Age: 83
End: 2018-08-21
Payer: MEDICARE

## 2018-08-21 VITALS
DIASTOLIC BLOOD PRESSURE: 70 MMHG | BODY MASS INDEX: 32.39 KG/M2 | TEMPERATURE: 98.2 F | RESPIRATION RATE: 18 BRPM | WEIGHT: 144 LBS | HEIGHT: 56 IN | HEART RATE: 74 BPM | OXYGEN SATURATION: 96 % | SYSTOLIC BLOOD PRESSURE: 140 MMHG

## 2018-08-21 DIAGNOSIS — M17.0 OSTEOARTHRITIS OF BOTH KNEES, UNSPECIFIED OSTEOARTHRITIS TYPE: ICD-10-CM

## 2018-08-21 DIAGNOSIS — I10 ESSENTIAL HYPERTENSION: Primary | ICD-10-CM

## 2018-08-21 DIAGNOSIS — Z13.820 SCREENING FOR OSTEOPOROSIS: ICD-10-CM

## 2018-08-21 DIAGNOSIS — R54 FRAIL ELDERLY: ICD-10-CM

## 2018-08-21 DIAGNOSIS — M54.2 CERVICALGIA: ICD-10-CM

## 2018-08-21 DIAGNOSIS — N18.30 CKD (CHRONIC KIDNEY DISEASE), STAGE III (HCC): ICD-10-CM

## 2018-08-21 DIAGNOSIS — M19.90 OSTEOARTHRITIS, UNSPECIFIED OSTEOARTHRITIS TYPE, UNSPECIFIED SITE: ICD-10-CM

## 2018-08-21 DIAGNOSIS — E78.5 DYSLIPIDEMIA: ICD-10-CM

## 2018-08-21 DIAGNOSIS — J30.2 SEASONAL ALLERGIC RHINITIS, UNSPECIFIED TRIGGER: ICD-10-CM

## 2018-08-21 PROCEDURE — 99214 OFFICE O/P EST MOD 30 MIN: CPT | Performed by: NURSE PRACTITIONER

## 2018-08-21 RX ORDER — AMLODIPINE BESYLATE 2.5 MG/1
2.5 TABLET ORAL DAILY
Qty: 90 TABLET | Refills: 1 | Status: SHIPPED | OUTPATIENT
Start: 2018-08-21 | End: 2019-04-15 | Stop reason: SDUPTHER

## 2018-08-21 RX ORDER — TRAMADOL HYDROCHLORIDE 50 MG/1
50 TABLET ORAL EVERY 12 HOURS PRN
Qty: 60 TABLET | Refills: 0 | Status: SHIPPED | OUTPATIENT
Start: 2018-08-21 | End: 2018-10-22 | Stop reason: SDUPTHER

## 2018-08-21 NOTE — PROGRESS NOTES
Assessment/Plan:    No problem-specific Assessment & Plan notes found for this encounter  Diagnoses and all orders for this visit:    Essential hypertension  -     Comprehensive metabolic panel; Future  -     Lipid panel; Future  -     TSH, 3rd generation with Free T4 reflex; Future  -     amLODIPine (NORVASC) 2 5 mg tablet; Take 1 tablet (2 5 mg total) by mouth daily    Seasonal allergic rhinitis, unspecified trigger  -     Comprehensive metabolic panel; Future    Osteoarthritis of both knees, unspecified osteoarthritis type    CKD (chronic kidney disease), stage III  -     Comprehensive metabolic panel; Future  -     TSH, 3rd generation with Free T4 reflex; Future  -     Vitamin D 25 hydroxy; Future    Dyslipidemia  -     Comprehensive metabolic panel; Future    Frail elderly  -     CBC and differential; Future    Screening for osteoporosis  -     DXA bone density spine hip and pelvis; Future    Cervicalgia  -     traMADol (ULTRAM) 50 mg tablet; Take 1 tablet (50 mg total) by mouth every 12 (twelve) hours as needed for severe pain (for pain)    Osteoarthritis, unspecified osteoarthritis type, unspecified site  -     Comprehensive metabolic panel; Future  -     Vitamin D 25 hydroxy; Future  -     traMADol (ULTRAM) 50 mg tablet; Take 1 tablet (50 mg total) by mouth every 12 (twelve) hours as needed for severe pain (for pain)        Chronic disease follow-up stable no change in her regimen  Asked the daughter to use over-the-counter Zantac and use for burping as needed 1 to 2 times a day - daughter does state that she has some at home  Next visit [facility] -needs Prevnar  Subjective:   Chief Complaint   Patient presents with    Follow-up     6 month    Labs done 8/14/18  HM: Patient had a eye exam in 6/26/18  by Monica Girard   Patient given the AWV today   Patient need a dexa scan          Patient ID: Nina Santoyo is a 80 y o  female      HPI  Patient here with her daughter for chronic disease follow-up with labs  Lab review done and stable  Twelve point ROS neg for falls/ but daughter reports burping after she eats  Not using OTCs  Patient uses tramadol for chronic knee pain/back pain, 1 tab usually just once a day - rarely twice a day, STEFAN MYLES MP checked - last refill in June; appropriate - refill sent  The following portions of the patient's history were reviewed and updated as appropriate: allergies, past social history, past surgical history and problem list     Review of Systems   Constitutional: Negative for activity change and appetite change  HENT: Negative  Eyes: Negative  Respiratory: Negative  Cardiovascular: Negative  Negative for chest pain  Gastrointestinal: Negative  Endocrine: Negative  Negative for cold intolerance  Genitourinary: Negative  Musculoskeletal: Positive for arthralgias and back pain  See hpi   Skin: Negative  Allergic/Immunologic: Negative  Neurological: Negative  Hematological: Negative  Psychiatric/Behavioral: Negative  All other systems reviewed and are negative  Objective:      /70 (BP Location: Left arm, Patient Position: Sitting, Cuff Size: Adult)   Pulse 74   Temp 98 2 °F (36 8 °C) (Oral)   Resp 18   Ht 4' 8 3" (1 43 m)   Wt 65 3 kg (144 lb)   SpO2 96%   BMI 31 94 kg/m²     Lipid profile:  183, triglycerides 68, HDL 62,   TSH 3 52  CMP normal limits with a BUN of 20 and a creatinine of 1 15-baseline 1 10; fasting blood sugar 99; albumin 3 4     Physical Exam   Constitutional: She is oriented to person, place, and time  She appears well-developed and well-nourished  No distress  HENT:   Head: Normocephalic  Right Ear: External ear normal  No decreased hearing is noted  Left Ear: External ear normal  No decreased hearing is noted  Mouth/Throat: Oropharynx is clear and moist    Left ear cerumen   Eyes: Conjunctivae are normal  Pupils are equal, round, and reactive to light     Neck: Normal range of motion  Neck supple  No thyromegaly present  Cardiovascular: Normal rate, regular rhythm, normal heart sounds and intact distal pulses  No murmur heard  Pulmonary/Chest: Effort normal and breath sounds normal  No respiratory distress  Abdominal: Soft  Bowel sounds are normal    Musculoskeletal: Normal range of motion  Lymphadenopathy:     She has no cervical adenopathy  Neurological: She is alert and oriented to person, place, and time  She has normal reflexes  No cranial nerve deficit  Coordination normal    Skin: Skin is warm and dry  Psychiatric: She has a normal mood and affect   Her behavior is normal  Judgment and thought content normal

## 2018-08-30 ENCOUNTER — HOSPITAL ENCOUNTER (OUTPATIENT)
Dept: RADIOLOGY | Age: 83
Discharge: HOME | End: 2018-08-30
Payer: MEDICARE

## 2018-08-30 DIAGNOSIS — Z13.820 SCREENING FOR OSTEOPOROSIS: ICD-10-CM

## 2018-08-30 PROCEDURE — 77080 DXA BONE DENSITY AXIAL: CPT

## 2018-09-10 DIAGNOSIS — I10 HYPERTENSION, UNSPECIFIED TYPE: ICD-10-CM

## 2018-09-11 RX ORDER — HYDROCHLOROTHIAZIDE 12.5 MG/1
CAPSULE, GELATIN COATED ORAL
Qty: 90 CAPSULE | Refills: 1 | Status: SHIPPED | OUTPATIENT
Start: 2018-09-11 | End: 2019-03-01 | Stop reason: SDUPTHER

## 2018-09-24 ENCOUNTER — CLINICAL SUPPORT (OUTPATIENT)
Dept: FAMILY MEDICINE CLINIC | Facility: CLINIC | Age: 83
End: 2018-09-24
Payer: MEDICARE

## 2018-09-24 DIAGNOSIS — Z23 NEED FOR VACCINATION WITH 13-POLYVALENT PNEUMOCOCCAL CONJUGATE VACCINE: ICD-10-CM

## 2018-09-24 DIAGNOSIS — Z23 NEED FOR INFLUENZA VACCINATION: Primary | ICD-10-CM

## 2018-09-24 PROCEDURE — G0008 ADMIN INFLUENZA VIRUS VAC: HCPCS

## 2018-09-24 PROCEDURE — 90662 IIV NO PRSV INCREASED AG IM: CPT

## 2018-09-24 PROCEDURE — 90670 PCV13 VACCINE IM: CPT

## 2018-09-24 PROCEDURE — G0009 ADMIN PNEUMOCOCCAL VACCINE: HCPCS

## 2018-10-02 ENCOUNTER — OFFICE VISIT (OUTPATIENT)
Dept: OBGYN CLINIC | Facility: HOSPITAL | Age: 83
End: 2018-10-02
Payer: MEDICARE

## 2018-10-03 ENCOUNTER — OFFICE VISIT (OUTPATIENT)
Dept: OBGYN CLINIC | Facility: HOSPITAL | Age: 83
End: 2018-10-03
Payer: MEDICARE

## 2018-10-22 DIAGNOSIS — M54.2 CERVICALGIA: ICD-10-CM

## 2018-10-22 DIAGNOSIS — M19.90 OSTEOARTHRITIS, UNSPECIFIED OSTEOARTHRITIS TYPE, UNSPECIFIED SITE: ICD-10-CM

## 2018-10-22 RX ORDER — TRAMADOL HYDROCHLORIDE 50 MG/1
50 TABLET ORAL EVERY 12 HOURS PRN
Qty: 60 TABLET | Refills: 0 | Status: SHIPPED | OUTPATIENT
Start: 2018-10-22 | End: 2018-12-07 | Stop reason: SDUPTHER

## 2018-12-07 DIAGNOSIS — M19.90 OSTEOARTHRITIS, UNSPECIFIED OSTEOARTHRITIS TYPE, UNSPECIFIED SITE: ICD-10-CM

## 2018-12-07 DIAGNOSIS — M54.2 CERVICALGIA: ICD-10-CM

## 2018-12-07 RX ORDER — TRAMADOL HYDROCHLORIDE 50 MG/1
50 TABLET ORAL EVERY 12 HOURS PRN
Qty: 60 TABLET | Refills: 0 | Status: SHIPPED | OUTPATIENT
Start: 2018-12-07 | End: 2019-01-15 | Stop reason: SDUPTHER

## 2018-12-11 ENCOUNTER — OFFICE VISIT (OUTPATIENT)
Dept: OBGYN CLINIC | Facility: HOSPITAL | Age: 83
End: 2018-12-11
Payer: MEDICARE

## 2018-12-12 ENCOUNTER — OFFICE VISIT (OUTPATIENT)
Dept: OBGYN CLINIC | Facility: HOSPITAL | Age: 83
End: 2018-12-12
Payer: MEDICARE

## 2018-12-31 DIAGNOSIS — I10 HYPERTENSION, UNSPECIFIED TYPE: ICD-10-CM

## 2019-01-02 RX ORDER — LISINOPRIL 20 MG/1
20 TABLET ORAL DAILY
Qty: 90 TABLET | Refills: 1 | Status: SHIPPED | OUTPATIENT
Start: 2019-01-02 | End: 2019-05-20 | Stop reason: SDUPTHER

## 2019-01-15 DIAGNOSIS — M19.90 OSTEOARTHRITIS, UNSPECIFIED OSTEOARTHRITIS TYPE, UNSPECIFIED SITE: ICD-10-CM

## 2019-01-15 DIAGNOSIS — M54.2 CERVICALGIA: ICD-10-CM

## 2019-01-15 RX ORDER — TRAMADOL HYDROCHLORIDE 50 MG/1
50 TABLET ORAL EVERY 12 HOURS PRN
Qty: 60 TABLET | Refills: 0 | Status: SHIPPED | OUTPATIENT
Start: 2019-01-15 | End: 2019-03-11 | Stop reason: SDUPTHER

## 2019-02-22 ENCOUNTER — APPOINTMENT (OUTPATIENT)
Dept: LAB | Facility: HOSPITAL | Age: 84
End: 2019-02-22
Payer: MEDICARE

## 2019-02-22 DIAGNOSIS — M19.90 OSTEOARTHRITIS, UNSPECIFIED OSTEOARTHRITIS TYPE, UNSPECIFIED SITE: ICD-10-CM

## 2019-02-22 DIAGNOSIS — E78.5 DYSLIPIDEMIA: ICD-10-CM

## 2019-02-22 DIAGNOSIS — I10 ESSENTIAL HYPERTENSION: ICD-10-CM

## 2019-02-22 DIAGNOSIS — R54 FRAIL ELDERLY: ICD-10-CM

## 2019-02-22 DIAGNOSIS — J30.2 SEASONAL ALLERGIC RHINITIS, UNSPECIFIED TRIGGER: ICD-10-CM

## 2019-02-22 DIAGNOSIS — N18.30 CKD (CHRONIC KIDNEY DISEASE), STAGE III (HCC): ICD-10-CM

## 2019-02-22 LAB
25(OH)D3 SERPL-MCNC: 60.3 NG/ML (ref 30–100)
ALBUMIN SERPL BCP-MCNC: 3.6 G/DL (ref 3.5–5)
ALP SERPL-CCNC: 79 U/L (ref 46–116)
ALT SERPL W P-5'-P-CCNC: 16 U/L (ref 12–78)
ANION GAP SERPL CALCULATED.3IONS-SCNC: 7 MMOL/L (ref 4–13)
AST SERPL W P-5'-P-CCNC: 12 U/L (ref 5–45)
BASOPHILS # BLD AUTO: 0.05 THOUSANDS/ΜL (ref 0–0.1)
BASOPHILS NFR BLD AUTO: 1 % (ref 0–1)
BILIRUB SERPL-MCNC: 0.38 MG/DL (ref 0.2–1)
BUN SERPL-MCNC: 33 MG/DL (ref 5–25)
CALCIUM SERPL-MCNC: 9.3 MG/DL (ref 8.3–10.1)
CHLORIDE SERPL-SCNC: 103 MMOL/L (ref 100–108)
CHOLEST SERPL-MCNC: 207 MG/DL (ref 50–200)
CO2 SERPL-SCNC: 28 MMOL/L (ref 21–32)
CREAT SERPL-MCNC: 1.31 MG/DL (ref 0.6–1.3)
EOSINOPHIL # BLD AUTO: 0.13 THOUSAND/ΜL (ref 0–0.61)
EOSINOPHIL NFR BLD AUTO: 2 % (ref 0–6)
ERYTHROCYTE [DISTWIDTH] IN BLOOD BY AUTOMATED COUNT: 12.5 % (ref 11.6–15.1)
GFR SERPL CREATININE-BSD FRML MDRD: 35 ML/MIN/1.73SQ M
GLUCOSE P FAST SERPL-MCNC: 98 MG/DL (ref 65–99)
HCT VFR BLD AUTO: 38.1 % (ref 34.8–46.1)
HDLC SERPL-MCNC: 66 MG/DL (ref 40–60)
HGB BLD-MCNC: 12.4 G/DL (ref 11.5–15.4)
IMM GRANULOCYTES # BLD AUTO: 0.02 THOUSAND/UL (ref 0–0.2)
IMM GRANULOCYTES NFR BLD AUTO: 0 % (ref 0–2)
LDLC SERPL CALC-MCNC: 118 MG/DL (ref 0–100)
LYMPHOCYTES # BLD AUTO: 1.65 THOUSANDS/ΜL (ref 0.6–4.47)
LYMPHOCYTES NFR BLD AUTO: 21 % (ref 14–44)
MCH RBC QN AUTO: 29.5 PG (ref 26.8–34.3)
MCHC RBC AUTO-ENTMCNC: 32.5 G/DL (ref 31.4–37.4)
MCV RBC AUTO: 91 FL (ref 82–98)
MONOCYTES # BLD AUTO: 0.32 THOUSAND/ΜL (ref 0.17–1.22)
MONOCYTES NFR BLD AUTO: 4 % (ref 4–12)
NEUTROPHILS # BLD AUTO: 5.77 THOUSANDS/ΜL (ref 1.85–7.62)
NEUTS SEG NFR BLD AUTO: 72 % (ref 43–75)
NONHDLC SERPL-MCNC: 141 MG/DL
NRBC BLD AUTO-RTO: 0 /100 WBCS
PLATELET # BLD AUTO: 168 THOUSANDS/UL (ref 149–390)
PMV BLD AUTO: 11.6 FL (ref 8.9–12.7)
POTASSIUM SERPL-SCNC: 4.7 MMOL/L (ref 3.5–5.3)
PROT SERPL-MCNC: 8 G/DL (ref 6.4–8.2)
RBC # BLD AUTO: 4.21 MILLION/UL (ref 3.81–5.12)
SODIUM SERPL-SCNC: 138 MMOL/L (ref 136–145)
TRIGL SERPL-MCNC: 115 MG/DL
TSH SERPL DL<=0.05 MIU/L-ACNC: 2.99 UIU/ML (ref 0.36–3.74)
WBC # BLD AUTO: 7.94 THOUSAND/UL (ref 4.31–10.16)

## 2019-02-22 PROCEDURE — 36415 COLL VENOUS BLD VENIPUNCTURE: CPT

## 2019-02-22 PROCEDURE — 84443 ASSAY THYROID STIM HORMONE: CPT

## 2019-02-22 PROCEDURE — 82306 VITAMIN D 25 HYDROXY: CPT

## 2019-02-22 PROCEDURE — 85025 COMPLETE CBC W/AUTO DIFF WBC: CPT

## 2019-02-22 PROCEDURE — 80061 LIPID PANEL: CPT

## 2019-02-22 PROCEDURE — 80053 COMPREHEN METABOLIC PANEL: CPT

## 2019-02-27 NOTE — PROGRESS NOTES
Assessment/Plan:    Hypertension  BP stable on current regimen    CKD (chronic kidney disease), stage III  GFR for 35-41 with a creatinine 1 12-1 35  Patient is on the high side of her creatinine baseline  She is not very good with water intake  Patient is not diabetic however has longstanding hypertension - BP is stable 130/76 -   3 teir medication regimen - 2 5 mg of Norvasc, 12 5 of HCTZ and 20 mg of lisinopril  Six months ago lisinopril was dropped down from 40 mg to 20 mg with improvement of and creatinine  Instructions given to push for glasses of water a day and more so during summer months  Osteoarthritis  Patient takes tramadol daily for neck pain/cervical osteoarthritis pain and activity is stable  Opioid contract in place 2017  Needs update next visit  Dyslipidemia  Stable lipid profile on fish oil only/lifestyle management    Frail elderly  Medical care wellness updated today  Accidental fall due to ice no injury  Diagnoses and all orders for this visit:    CKD (chronic kidney disease), stage III ([facility] [address] )  -     Comprehensive metabolic panel; Future    Peripheral vascular disease (HCC)    Dyslipidemia    Frail elderly    Seasonal allergies    Essential hypertension  -     Comprehensive metabolic panel; Future    Impacted cerumen of left ear  -     Ambulatory Referral to Otolaryngology; Future    Osteoarthritis of both knees, unspecified osteoarthritis type    Other orders  -     Ear cerumen removal          Subjective: 6 month follow up  Labs 02/22/2019      Health Maintenance Due   Topic Date Due    Medicare Annual Wellness Visit (AWV)  05/30/1925    BMI: Followup Plan  05/30/1943    DTaP,Tdap,and Td Vaccines (1 - Tdap) 05/30/1946    Fall Risk  05/30/1990    Urinary Incontinence Screening  05/30/1990    Depression Screening PHQ  02/22/2019          Patient ID: Nick White is a 80 y o  female      HPI  Chronic disease with a [place]/lab review  One accidental fall on ice no injuries  Med list verified  Lab review done - creatinine is bumped to 1 35-patient and daughter did admit to low intake of water  BP is stable on current regimen  Pain control on 1 tramadol a day  HPI:  Brayan Villanueva is a 80 y o  female here for her Subsequent Wellness Visit      Patient Active Problem List   Diagnosis    CKD [facility]chronic kidney disease), stage III ([facility] [address] )    Dyslipidemia    Frail elderly    Hypertension    Osteoarthritis    Seasonal allergies    [facility]     Past Medical History:   Diagnosis Date    Chronic renal failure     Last assessed 11/11/13    Impaired fasting glucose     Intermittent claudication (HCC)     Last assessed 8/18/16    Vitamin D deficiency     Last assessed 08/05/13     Past Surgical History:   Procedure Laterality Date    CATARACT EXTRACTION  07/2005    KNEE ARTHROPLASTY Bilateral     REPLACEMENT TOTAL KNEE Bilateral 2004    knee replacement in R &L knee     Family History   Problem Relation Age of Onset    Diabetes Mother     Emphysema Father     Hypertension Sister      Social History     Tobacco Use   Smoking Status Never Smoker   Smokeless Tobacco Never Used     Social History     Substance and Sexual Activity   Alcohol Use No      Social History     Substance and Sexual Activity   Drug Use No       Current Outpatient Medications   Medication Sig Dispense Refill    amLODIPine (NORVASC) 2 5 mg tablet Take 1 tablet (2 5 mg total) by mouth daily 90 tablet 1    Calcium Carbonate (CALTRATE 600) 1500 (600 Ca) MG TABS Take 1 tablet by mouth daily      Cholecalciferol (VITAMIN D) 2000 units CAPS Take 1 tablet by mouth daily      hydrochlorothiazide (MICROZIDE) 12 5 mg capsule TAKE 1 CAPSULE EVERY DAY 90 capsule 1    lisinopril (ZESTRIL) 20 mg tablet Take 1 tablet (20 mg total) by mouth daily 90 tablet 1    Multiple Vitamin (MULTI-VITAMIN DAILY PO) Take by mouth daily      Omega-3 Fatty Acids (FISH OIL) 1,000 mg Take 2 capsules by mouth daily      traMADol (ULTRAM) 50 mg tablet Take 1 tablet (50 mg total) by mouth every 12 (twelve) hours as needed for severe pain (for pain) 60 tablet 0    Vit I-Opjgcxr-Uubizi-Bromelain (VITAMIN C-QUERCETIN-CITRUS BIO) 114-143-260-50 MG CAPS Take by mouth      GLUCOSAMINE CHONDROITIN COMPLX PO Take 1 capsule by mouth daily       No current facility-administered medications for this visit  No Active Allergies  Immunization History   Administered Date(s) Administered    INFLUENZA 10/19/2016, 10/06/2017    Influenza Split High Dose Preservative Free IM 11/08/2013, 09/15/2014, 10/12/2015, 10/19/2016, 10/06/2017    Influenza TIV (IM) 11/29/2002, 11/29/2002    Influenza, high dose seasonal 0 5 mL 09/24/2018    Pneumococcal Conjugate 13-Valent 09/24/2018    Pneumococcal Polysaccharide PPV23 11/29/2002, 11/29/2012, 10/28/2015       Patient Care Team:  Dane Litten as PCP - General  Arline Baumgarten, DPM      Medicare Screening Tests and Risk Assessments: Marita Escudero is here for her Subsequent Wellness visit  Last Medicare Wellness visit information reviewed, patient interviewed and updates made to the record today  Health Risk Assessment:  Patient rates overall health as good  Patient feels that their physical health rating is Same  Eyesight was rated as Same  Hearing was rated as Slightly worse  Patient feels that their emotional and mental health rating is Same  Pain experienced by patient in the last 7 days has been None  Patient states that she has experienced no weight loss or gain in last 6 months  Emotional/Mental Health:  Patient has been feeling nervous/anxious  PHQ-9 Depression Screening:    Frequency of the following problems over the past two weeks:      1  Little interest or pleasure in doing things: 0 - not at all      2  Feeling down, depressed, or hopeless: 0 - not at all  PHQ-2 Score: 0          Broken Bones/Falls:     Fall Risk Assessment:    In the past year, patient has experienced: History of falling in past year     Number of falls: 1  Patient does not feel she is unsteady standing  Patient is taking medication that can cause feelings of lightheadedness or tiredness  Patient often has no need to rush to the toilet  Chronic conditions that may contribute to falls arthritis and musculoskeletal diseases  Bladder/Bowel:  Patient has not leaked urine accidently in the last six months  Patient reports no loss of bowel control  Immunizations:  Patient has had a flu vaccination within the last year  Patient has received a pneumonia shot  Patient has not received a shingles shot  Patient has not received tetanus/diphtheria shot  Home Safety:  Patient does not have trouble with stairs inside or outside of their home  Patient currently reports that there are no safety hazards present in home, working smoke alarms, working carbon monoxide detectors  Preventative Screenings:   No breast cancer screening performed, no colon cancer screen completed, cholesterol screen completed, glaucoma eye exam completed,     Nutrition:  Current diet: Regular, Limited junk food and Low Cholesterol with servings of the following:    Medications:  Patient is currently taking over-the-counter supplements  Patient is able to manage medications  (Additional Comments: vitamens)Lifestyle Choices:  Patient reports no tobacco use  Patient has not smoked or used tobacco in the past   Patient reports no alcohol use  Patient does not drive a vehicle  Patient wears seat belt  Current level of exercise of physical activity described by patient as: walking 20 minutes 3 to 5 times a day depending on whether          Activities of Daily Living:  Can get out of bed by his or her self, able to dress self, unable to make own meals, unable to do own shopping, able to bathe self, can do own laundry/housekeeping, unable to manage own money and other related tasks    Previous Hospitalizations:  No hospitalization or ED visit in past 12 months        Advanced Directives:  Patient has decided on a power of   Patient has spoken to designated power of   Patient has completed advanced directive  Preventative Screening/Counseling:      Cardiovascular:      General: Risks and Benefits Discussed and Screening Current      Counseling: Healthy Diet and Healthy Weight          Diabetes:      General: Risks and Benefits Discussed and Screening Current      Counseling: Healthy Diet and Healthy Weight          Colorectal Cancer:      General: Screening Not Indicated          Breast Cancer:      General: Screening Not Indicated          Cervical Cancer:      General: Screening Not Indicated          Osteoporosis:      General: Risks and Benefits Discussed and Screening Current      Counseling: Calcium and Vitamin D Intake and Regular Weightbearing Exercise          AAA:      General: Screening Not Indicated          Glaucoma:      General: Risks and Benefits Discussed and Screening Current          HIV:      General: Screening Not Indicated          Hepatitis C:      General: Screening Not Indicated        Advanced Directives:   Patient has living will for healthcare, has durable POA for healthcare, patient has an advanced directive  No 5 wishes given  No end of life assessment reviewed with patient  Additional Comments: Five wishes in EMR 2017      No exam data present  Reviewed Updated [facility] Prior Wellness Visits:   Last Medicare wellness visit information was reviewed, patient interviewed , no change since last AWVno  Last Medicare wellness visit information was reviewed, patient interviewed and updates made to the record today yes    Assessment and Plan:  1  CKD (chronic kidney disease), stage III (HCC)  Comprehensive metabolic panel   2  Peripheral vascular disease ([address] )     3  Dyslipidemia     4  Frail elderly     5  Seasonal allergies     6   Essential hypertension  Comprehensive metabolic panel   7  Impacted cerumen of left ear  Ambulatory Referral to Otolaryngology   8  Osteoarthritis of both knees, unspecified osteoarthritis type         Health Maintenance Due   Topic Date Due    Medicare Annual Wellness Visit (AWV)  05/30/1925    BMI: Followup Plan  05/30/1943    DTaP,Tdap,and Td Vaccines (1 - Tdap) 05/30/1946    Fall Risk  05/30/1990    Urinary Incontinence Screening  05/30/1990    Depression Screening PHQ  02/22/2019         The following portions of the patient's history were reviewed and updated as appropriate: allergies, past social history, past surgical history and problem list     Review of Systems   Constitutional: Negative for activity change and appetite change  HENT: Negative  Eyes: Negative  Respiratory: Negative  Cardiovascular: Negative  Negative for chest pain  Gastrointestinal: Negative  Negative for bowel incontinence  Endocrine: Negative  Negative for cold intolerance  Genitourinary: Negative  Musculoskeletal: Positive for arthralgias, arthritis and back pain  See hpi   Skin: Negative  Allergic/Immunologic: Negative  Neurological: Negative  Hematological: Negative  Psychiatric/Behavioral: Negative  The patient is not nervous/anxious  All other systems reviewed and are negative  Objective:      /76 (BP Location: Left arm, Patient Position: Sitting, Cuff Size: Standard)   Pulse 86   Temp 98 2 °F (36 8 °C) (Tympanic)   Resp 16   Ht 4' 8" (1 422 m)   Wt 63 8 kg (140 lb 9 6 oz)   SpO2 94%   BMI 31 52 kg/m²     Ceruminosis is noted  Wax is removed rt ear canal by  manual debridement  LT ear impacted  Needs ENT  Referral  Instructions for home care to prevent wax buildup are given    Ear cerumen removal  Date/Time: 2/28/2019 11:49 AM  Performed by: DAMION Flores  Authorized by: DAMION Flores     Patient location:  Other (comment)  Other Assisting Provider: No    Consent:     Consent obtained:  Verbal Consent given by:  Patient    Risks discussed:  Bleeding, incomplete removal and pain    Alternatives discussed:  Referral  [place] protocol:     Procedure explained and questions answered to patient or proxy's satisfaction: yes      Patient identity confirmed:  Verbally with patient  Procedure details:     Location:  L ear and R ear    Procedure type: curette      Approach:  Natural orifice  Post-procedure details:     Complication:  None    Hearing quality:  Improved    Patient tolerance of procedure: Tolerated well, no immediate complications  Comments:      Right ear cerumen removed left ear cerumen impacted - unable to remove referral to ENT  Physical Exam   Constitutional: She is oriented to person, place, and time  She appears well-developed and well-nourished  No distress  HENT:   Head: Normocephalic  Right Ear: External ear normal  No decreased hearing is noted  Left Ear: External ear normal  No decreased hearing is noted  Mouth/Throat: Oropharynx is clear and moist    Left ear cerumen impaction   Eyes: Pupils are equal, round, and reactive to light  Conjunctivae are normal    Neck: Normal range of motion  Neck supple  No thyromegaly present  Cardiovascular: Normal rate, regular rhythm, normal heart sounds and intact distal pulses  No murmur heard  Pulmonary/Chest: Effort normal and breath sounds normal  No respiratory distress  Abdominal: Soft  Bowel sounds are normal    Musculoskeletal: Normal range of motion  Lymphadenopathy:     She has no cervical adenopathy  Neurological: She is alert and oriented to person, place, and time  She has normal reflexes  No cranial nerve deficit  Coordination normal    Skin: Skin is warm and dry  Psychiatric: She has a normal mood and affect  Her behavior is normal  Judgment and thought content normal      BMI Counseling: Body mass index is 31 52 kg/m²  Discussed the patient's BMI with her  The BMI is above average   No BMI follow-up plan is appropriate  Patient is 72 years old and weight reduction/weight gain would further complicate their underlying physical disability   age

## 2019-02-28 ENCOUNTER — OFFICE VISIT (OUTPATIENT)
Dept: FAMILY MEDICINE CLINIC | Facility: CLINIC | Age: 84
End: 2019-02-28
Payer: MEDICARE

## 2019-02-28 VITALS
WEIGHT: 140.6 LBS | DIASTOLIC BLOOD PRESSURE: 76 MMHG | OXYGEN SATURATION: 94 % | HEART RATE: 86 BPM | RESPIRATION RATE: 16 BRPM | TEMPERATURE: 98.2 F | HEIGHT: 56 IN | BODY MASS INDEX: 31.63 KG/M2 | SYSTOLIC BLOOD PRESSURE: 130 MMHG

## 2019-02-28 DIAGNOSIS — I73.9 PERIPHERAL VASCULAR DISEASE (HCC): ICD-10-CM

## 2019-02-28 DIAGNOSIS — E78.5 DYSLIPIDEMIA: ICD-10-CM

## 2019-02-28 DIAGNOSIS — N18.30 CKD (CHRONIC KIDNEY DISEASE), STAGE III (HCC): Primary | ICD-10-CM

## 2019-02-28 DIAGNOSIS — R54 FRAIL ELDERLY: ICD-10-CM

## 2019-02-28 DIAGNOSIS — I10 ESSENTIAL HYPERTENSION: ICD-10-CM

## 2019-02-28 DIAGNOSIS — J30.2 SEASONAL ALLERGIES: ICD-10-CM

## 2019-02-28 DIAGNOSIS — M17.0 OSTEOARTHRITIS OF BOTH KNEES, UNSPECIFIED OSTEOARTHRITIS TYPE: ICD-10-CM

## 2019-02-28 DIAGNOSIS — Z00.00 MEDICARE ANNUAL WELLNESS VISIT, SUBSEQUENT: ICD-10-CM

## 2019-02-28 DIAGNOSIS — H61.22 IMPACTED CERUMEN OF LEFT EAR: ICD-10-CM

## 2019-02-28 PROCEDURE — 69210 REMOVE IMPACTED EAR WAX UNI: CPT | Performed by: NURSE PRACTITIONER

## 2019-02-28 PROCEDURE — G0439 PPPS, SUBSEQ VISIT: HCPCS | Performed by: NURSE PRACTITIONER

## 2019-02-28 PROCEDURE — 99214 OFFICE O/P EST MOD 30 MIN: CPT | Performed by: NURSE PRACTITIONER

## 2019-02-28 NOTE — ASSESSMENT & PLAN NOTE
Patient takes tramadol daily for neck pain/cervical osteoarthritis pain and activity is stable  Opioid contract in place 2017  Needs update next visit

## 2019-02-28 NOTE — ASSESSMENT & PLAN NOTE
GFR for 35-41 with a creatinine 1 12-1 35  Patient is on the high side of her creatinine baseline  She is not very good with water intake  Patient is not diabetic however has longstanding hypertension - BP is stable 130/76 -   3 teir medication regimen - 2 5 mg of Norvasc, 12 5 of HCTZ and 20 mg of lisinopril  Six months ago lisinopril was dropped down from 40 mg to 20 mg with improvement of and creatinine  Instructions given to push for glasses of water a day and more so during summer months

## 2019-03-01 DIAGNOSIS — I10 HYPERTENSION, UNSPECIFIED TYPE: ICD-10-CM

## 2019-03-04 ENCOUNTER — OFFICE VISIT (OUTPATIENT)
Dept: OBGYN CLINIC | Facility: HOSPITAL | Age: 84
End: 2019-03-04
Payer: MEDICARE

## 2019-03-04 RX ORDER — HYDROCHLOROTHIAZIDE 12.5 MG/1
CAPSULE, GELATIN COATED ORAL
Qty: 90 CAPSULE | Refills: 1 | Status: SHIPPED | OUTPATIENT
Start: 2019-03-04 | End: 2019-07-22 | Stop reason: SDUPTHER

## 2019-03-05 ENCOUNTER — OFFICE VISIT (OUTPATIENT)
Dept: OBGYN CLINIC | Facility: HOSPITAL | Age: 84
End: 2019-03-05
Payer: MEDICARE

## 2019-03-11 DIAGNOSIS — M19.90 OSTEOARTHRITIS, UNSPECIFIED OSTEOARTHRITIS TYPE, UNSPECIFIED SITE: ICD-10-CM

## 2019-03-11 DIAGNOSIS — M54.2 CERVICALGIA: ICD-10-CM

## 2019-03-11 RX ORDER — TRAMADOL HYDROCHLORIDE 50 MG/1
50 TABLET ORAL EVERY 12 HOURS PRN
Qty: 60 TABLET | Refills: 0 | Status: SHIPPED | OUTPATIENT
Start: 2019-03-11 | End: 2019-05-13 | Stop reason: SDUPTHER

## 2019-04-15 DIAGNOSIS — I10 ESSENTIAL HYPERTENSION: ICD-10-CM

## 2019-04-15 RX ORDER — AMLODIPINE BESYLATE 2.5 MG/1
2.5 TABLET ORAL DAILY
Qty: 90 TABLET | Refills: 1 | Status: SHIPPED | OUTPATIENT
Start: 2019-04-15 | End: 2019-08-28 | Stop reason: SDUPTHER

## 2019-05-13 DIAGNOSIS — M19.90 OSTEOARTHRITIS, UNSPECIFIED OSTEOARTHRITIS TYPE, UNSPECIFIED SITE: ICD-10-CM

## 2019-05-13 DIAGNOSIS — M54.2 CERVICALGIA: ICD-10-CM

## 2019-05-13 RX ORDER — TRAMADOL HYDROCHLORIDE 50 MG/1
50 TABLET ORAL EVERY 12 HOURS PRN
Qty: 60 TABLET | Refills: 0 | Status: SHIPPED | OUTPATIENT
Start: 2019-05-13 | End: 2019-07-12 | Stop reason: SDUPTHER

## 2019-05-20 DIAGNOSIS — I10 HYPERTENSION, UNSPECIFIED TYPE: ICD-10-CM

## 2019-05-21 RX ORDER — LISINOPRIL 20 MG/1
TABLET ORAL
Qty: 90 TABLET | Refills: 1 | Status: SHIPPED | OUTPATIENT
Start: 2019-05-21 | End: 2019-10-02 | Stop reason: SDUPTHER

## 2019-05-28 ENCOUNTER — OFFICE VISIT (OUTPATIENT)
Dept: OBGYN CLINIC | Facility: HOSPITAL | Age: 84
End: 2019-05-28
Payer: MEDICARE

## 2019-05-29 ENCOUNTER — OFFICE VISIT (OUTPATIENT)
Dept: OBGYN CLINIC | Facility: HOSPITAL | Age: 84
End: 2019-05-29
Payer: MEDICARE

## 2019-07-12 DIAGNOSIS — M19.90 OSTEOARTHRITIS, UNSPECIFIED OSTEOARTHRITIS TYPE, UNSPECIFIED SITE: ICD-10-CM

## 2019-07-12 DIAGNOSIS — M54.2 CERVICALGIA: ICD-10-CM

## 2019-07-15 RX ORDER — TRAMADOL HYDROCHLORIDE 50 MG/1
50 TABLET ORAL EVERY 12 HOURS PRN
Qty: 60 TABLET | Refills: 0 | Status: SHIPPED | OUTPATIENT
Start: 2019-07-15 | End: 2019-09-16 | Stop reason: SDUPTHER

## 2019-07-22 DIAGNOSIS — I10 HYPERTENSION, UNSPECIFIED TYPE: ICD-10-CM

## 2019-07-22 RX ORDER — HYDROCHLOROTHIAZIDE 12.5 MG/1
CAPSULE, GELATIN COATED ORAL
Qty: 90 CAPSULE | Refills: 1 | Status: SHIPPED | OUTPATIENT
Start: 2019-07-22 | End: 2019-12-09 | Stop reason: SDUPTHER

## 2019-08-19 ENCOUNTER — OFFICE VISIT (OUTPATIENT)
Dept: OBGYN CLINIC | Facility: HOSPITAL | Age: 84
End: 2019-08-19
Payer: MEDICARE

## 2019-08-26 ENCOUNTER — OFFICE VISIT (OUTPATIENT)
Dept: OBGYN CLINIC | Facility: HOSPITAL | Age: 84
End: 2019-08-26
Payer: MEDICARE

## 2019-08-28 DIAGNOSIS — I10 ESSENTIAL HYPERTENSION: ICD-10-CM

## 2019-08-28 RX ORDER — AMLODIPINE BESYLATE 2.5 MG/1
2.5 TABLET ORAL DAILY
Qty: 90 TABLET | Refills: 1 | Status: SHIPPED | OUTPATIENT
Start: 2019-08-28 | End: 2020-01-20

## 2019-09-03 ENCOUNTER — APPOINTMENT (OUTPATIENT)
Dept: LAB | Facility: HOSPITAL | Age: 84
End: 2019-09-03
Payer: MEDICARE

## 2019-09-03 DIAGNOSIS — N18.30 CKD (CHRONIC KIDNEY DISEASE), STAGE III (HCC): ICD-10-CM

## 2019-09-03 DIAGNOSIS — I10 ESSENTIAL HYPERTENSION: ICD-10-CM

## 2019-09-03 LAB
ALBUMIN SERPL BCP-MCNC: 3.2 G/DL (ref 3.5–5)
ALP SERPL-CCNC: 72 U/L (ref 46–116)
ALT SERPL W P-5'-P-CCNC: 15 U/L (ref 12–78)
ANION GAP SERPL CALCULATED.3IONS-SCNC: 6 MMOL/L (ref 4–13)
AST SERPL W P-5'-P-CCNC: 12 U/L (ref 5–45)
BILIRUB SERPL-MCNC: 0.48 MG/DL (ref 0.2–1)
BUN SERPL-MCNC: 27 MG/DL (ref 5–25)
CALCIUM SERPL-MCNC: 9.3 MG/DL (ref 8.3–10.1)
CHLORIDE SERPL-SCNC: 105 MMOL/L (ref 100–108)
CO2 SERPL-SCNC: 28 MMOL/L (ref 21–32)
CREAT SERPL-MCNC: 1.29 MG/DL (ref 0.6–1.3)
GFR SERPL CREATININE-BSD FRML MDRD: 36 ML/MIN/1.73SQ M
GLUCOSE P FAST SERPL-MCNC: 94 MG/DL (ref 65–99)
POTASSIUM SERPL-SCNC: 4.1 MMOL/L (ref 3.5–5.3)
PROT SERPL-MCNC: 8.1 G/DL (ref 6.4–8.2)
SODIUM SERPL-SCNC: 139 MMOL/L (ref 136–145)

## 2019-09-03 PROCEDURE — 36415 COLL VENOUS BLD VENIPUNCTURE: CPT

## 2019-09-03 PROCEDURE — 80053 COMPREHEN METABOLIC PANEL: CPT

## 2019-09-05 ENCOUNTER — OFFICE VISIT (OUTPATIENT)
Dept: FAMILY MEDICINE CLINIC | Facility: CLINIC | Age: 84
End: 2019-09-05
Payer: MEDICARE

## 2019-09-05 VITALS
TEMPERATURE: 98.6 F | SYSTOLIC BLOOD PRESSURE: 110 MMHG | OXYGEN SATURATION: 98 % | BODY MASS INDEX: 30.82 KG/M2 | HEART RATE: 87 BPM | DIASTOLIC BLOOD PRESSURE: 72 MMHG | RESPIRATION RATE: 18 BRPM | WEIGHT: 137 LBS | HEIGHT: 56 IN

## 2019-09-05 DIAGNOSIS — I10 ESSENTIAL HYPERTENSION: Primary | ICD-10-CM

## 2019-09-05 DIAGNOSIS — N18.30 CKD (CHRONIC KIDNEY DISEASE), STAGE III (HCC): ICD-10-CM

## 2019-09-05 DIAGNOSIS — R54 FRAIL ELDERLY: ICD-10-CM

## 2019-09-05 DIAGNOSIS — E78.5 DYSLIPIDEMIA: ICD-10-CM

## 2019-09-05 DIAGNOSIS — J30.2 SEASONAL ALLERGIES: ICD-10-CM

## 2019-09-05 PROCEDURE — 99214 OFFICE O/P EST MOD 30 MIN: CPT | Performed by: NURSE PRACTITIONER

## 2019-09-05 NOTE — ASSESSMENT & PLAN NOTE
BP a little low today but did not eat this am   Denies sxs of dizziness/falls/ orthostatic sxs  Cont current tx

## 2019-09-16 DIAGNOSIS — M19.90 OSTEOARTHRITIS, UNSPECIFIED OSTEOARTHRITIS TYPE, UNSPECIFIED SITE: ICD-10-CM

## 2019-09-16 DIAGNOSIS — M54.2 CERVICALGIA: ICD-10-CM

## 2019-09-18 RX ORDER — TRAMADOL HYDROCHLORIDE 50 MG/1
50 TABLET ORAL EVERY 12 HOURS PRN
Qty: 60 TABLET | Refills: 0 | Status: SHIPPED | OUTPATIENT
Start: 2019-09-18 | End: 2019-11-18 | Stop reason: SDUPTHER

## 2019-10-02 DIAGNOSIS — I10 HYPERTENSION, UNSPECIFIED TYPE: ICD-10-CM

## 2019-10-02 RX ORDER — LISINOPRIL 20 MG/1
TABLET ORAL
Qty: 90 TABLET | Refills: 1 | Status: SHIPPED | OUTPATIENT
Start: 2019-10-02 | End: 2020-07-06 | Stop reason: SDUPTHER

## 2019-10-03 ENCOUNTER — CLINICAL SUPPORT (OUTPATIENT)
Dept: FAMILY MEDICINE CLINIC | Facility: CLINIC | Age: 84
End: 2019-10-03
Payer: MEDICARE

## 2019-10-03 DIAGNOSIS — Z23 NEED FOR INFLUENZA VACCINATION: Primary | ICD-10-CM

## 2019-10-03 PROCEDURE — 90662 IIV NO PRSV INCREASED AG IM: CPT

## 2019-10-03 PROCEDURE — G0008 ADMIN INFLUENZA VIRUS VAC: HCPCS

## 2019-10-03 NOTE — PROGRESS NOTES
Patient is here for the flu vaccine   Patient received flu vaccine in the right deltoid   Patient tolerated well

## 2019-11-18 DIAGNOSIS — M19.90 OSTEOARTHRITIS, UNSPECIFIED OSTEOARTHRITIS TYPE, UNSPECIFIED SITE: ICD-10-CM

## 2019-11-18 DIAGNOSIS — M54.2 CERVICALGIA: ICD-10-CM

## 2019-11-18 RX ORDER — TRAMADOL HYDROCHLORIDE 50 MG/1
50 TABLET ORAL EVERY 12 HOURS PRN
Qty: 60 TABLET | Refills: 0 | Status: SHIPPED | OUTPATIENT
Start: 2019-11-18 | End: 2020-01-13 | Stop reason: SDUPTHER

## 2019-12-09 DIAGNOSIS — I10 HYPERTENSION, UNSPECIFIED TYPE: ICD-10-CM

## 2019-12-09 RX ORDER — HYDROCHLOROTHIAZIDE 12.5 MG/1
CAPSULE, GELATIN COATED ORAL
Qty: 90 CAPSULE | Refills: 0 | Status: SHIPPED | OUTPATIENT
Start: 2019-12-09 | End: 2020-02-13

## 2019-12-09 NOTE — PROGRESS NOTES
Assessment/Plan:  RTO for vaccines in 4 weeks  rto for 6 mosf/u in 6 mos w/ awv  CKD (chronic kidney disease), stage III  Stable - creatinine remains stable w/ in baseline  Essential hypertension  BP a little low today but did not eat this am   Denies sxs of dizziness/falls/ orthostatic sxs  Cont current tx  Dyslipidemia  Stable on lifestyle management of dz/ fish oil - lipid noted 2/2019 - acceptable for age  Frail elderly  No recent falls/ doing well/ lives w/ daughter  Seasonal allergies  As needed antihistamines OTCs; informed daughter to avoid benadryl       Diagnoses and all orders for this visit:    Essential hypertension  -     Comprehensive metabolic panel; Future  -     Lipid panel; Future  -     TSH, 3rd generation with Free T4 reflex; Future  -     CBC and differential; Future  -     Vitamin D 25 hydroxy; Future    CKD (chronic kidney disease), stage III (HCC)  -     Comprehensive metabolic panel; Future  -     Lipid panel; Future  -     TSH, 3rd generation with Free T4 reflex; Future  -     CBC and differential; Future  -     Vitamin D 25 hydroxy; Future    Dyslipidemia  -     Comprehensive metabolic panel; Future  -     Lipid panel; Future  -     TSH, 3rd generation with Free T4 reflex; Future  -     CBC and differential; Future  -     Vitamin D 25 hydroxy; Future    Frail elderly  -     Comprehensive metabolic panel; Future  -     Lipid panel; Future  -     TSH, 3rd generation with Free T4 reflex; Future  -     CBC and differential; Future  -     Vitamin D 25 hydroxy; Future    Seasonal allergies  -     Comprehensive metabolic panel; Future  -     Lipid panel; Future  -     TSH, 3rd generation with Free T4 reflex; Future  -     CBC and differential; Future  -     Vitamin D 25 hydroxy; Future          Subjective:      Patient ID: Jeff Mixon is a 80 y o  female  HPI   Here w/ her daughter - she is current residing w/ daughter  Chronic dz follow up w/ lab review - stable    CMP noted - kidney function stable  No complaints  Med list verified  The following portions of the patient's history were reviewed and updated as appropriate: allergies, past social history, past surgical history and problem list     Review of Systems   Constitutional: Negative for activity change and appetite change  HENT: Negative  Eyes: Negative  Respiratory: Negative  Cardiovascular: Negative  Negative for chest pain  Gastrointestinal: Negative  Endocrine: Negative  Negative for cold intolerance  Genitourinary: Negative  Musculoskeletal: Negative  Skin: Negative  Allergic/Immunologic: Negative  Neurological: Negative  Negative for dizziness, weakness and light-headedness  Hematological: Negative  Psychiatric/Behavioral: Negative  All other systems reviewed and are negative  Objective:      /72 (BP Location: Left arm, Patient Position: Sitting, Cuff Size: Standard)   Pulse 87   Temp 98 6 °F (37 °C) (Oral)   Resp 18   Ht 4' 8" (1 422 m)   Wt 62 1 kg (137 lb)   SpO2 98%   BMI 30 71 kg/m²          Physical Exam   Constitutional: She is oriented to person, place, and time  She appears well-developed and well-nourished  No distress  HENT:   Head: Normocephalic  Right Ear: Tympanic membrane and external ear normal  No decreased hearing is noted  Left Ear: Tympanic membrane and external ear normal  No decreased hearing is noted  Mouth/Throat: Oropharynx is clear and moist    Eyes: Pupils are equal, round, and reactive to light  Conjunctivae are normal    Neck: Normal range of motion  Neck supple  No thyromegaly present  Cardiovascular: Normal rate, regular rhythm, normal heart sounds and intact distal pulses  No murmur heard  Pulmonary/Chest: Effort normal and breath sounds normal  No respiratory distress  Abdominal: Soft  Bowel sounds are normal    Musculoskeletal: Normal range of motion  Lymphadenopathy:     She has no cervical adenopathy  Neurological: She is alert and oriented to person, place, and time  She has normal strength and normal reflexes  No cranial nerve deficit  Gait normal  GCS eye subscore is 4  GCS verbal subscore is 5  GCS motor subscore is 6  Skin: Skin is warm and dry  Psychiatric: She has a normal mood and affect   Her behavior is normal  Judgment and thought content normal  89M,  and living with wife in [place], with no reported PHx and MHx of HTN, hypothyroidism, chronic venous insufficiency  and recent admission to this hospital for acute cholecystitis (s/p percutaneous cholecystostomy tube on 11/26/19) from which he was DC'd to [facility], BIBEMS from [facility] with cough x1 day and found to have PNA. Psych consult was requested due to report of pt's wife to primary team that he had seemed depressed at home several weeks ago. On interview in his room, pt is cooperative with interview, but has some difficulty speaking due to SOB and productive-sounding cough. When informed of his wife's concern, pt responds, "It's true, I was feeling down in the dumps a few weeks ago because of the medical situation, but I'm doing better now." Pt describes mood as "all right," denies SI/HI/AVH and denies neurovegetative sx of depression, reporting that his sleep and appetite are both "fine." Pt denies interest in starting an antidepressant for mood support and also denies interest in f/u with psychiatry, either while admitted or as OP.

## 2020-01-13 DIAGNOSIS — M54.2 CERVICALGIA: ICD-10-CM

## 2020-01-13 DIAGNOSIS — M19.90 OSTEOARTHRITIS, UNSPECIFIED OSTEOARTHRITIS TYPE, UNSPECIFIED SITE: ICD-10-CM

## 2020-01-15 RX ORDER — TRAMADOL HYDROCHLORIDE 50 MG/1
50 TABLET ORAL EVERY 12 HOURS PRN
Qty: 60 TABLET | Refills: 0 | Status: SHIPPED | OUTPATIENT
Start: 2020-01-15 | End: 2020-03-09 | Stop reason: SDUPTHER

## 2020-01-20 DIAGNOSIS — I10 ESSENTIAL HYPERTENSION: ICD-10-CM

## 2020-01-20 RX ORDER — AMLODIPINE BESYLATE 2.5 MG/1
2.5 TABLET ORAL DAILY
Qty: 90 TABLET | Refills: 0 | Status: SHIPPED | OUTPATIENT
Start: 2020-01-20 | End: 2020-03-24

## 2020-02-12 DIAGNOSIS — I10 HYPERTENSION, UNSPECIFIED TYPE: ICD-10-CM

## 2020-02-13 RX ORDER — HYDROCHLOROTHIAZIDE 12.5 MG/1
CAPSULE, GELATIN COATED ORAL
Qty: 90 CAPSULE | Refills: 0 | Status: SHIPPED | OUTPATIENT
Start: 2020-02-13 | End: 2020-04-20

## 2020-02-27 ENCOUNTER — TRANSCRIBE ORDERS (OUTPATIENT)
Dept: LAB | Facility: HOSPITAL | Age: 85
End: 2020-02-27

## 2020-02-27 ENCOUNTER — APPOINTMENT (OUTPATIENT)
Dept: LAB | Facility: HOSPITAL | Age: 85
End: 2020-02-27
Payer: MEDICARE

## 2020-02-27 DIAGNOSIS — R54 FRAIL ELDERLY: ICD-10-CM

## 2020-02-27 DIAGNOSIS — N18.30 CKD (CHRONIC KIDNEY DISEASE), STAGE III (HCC): ICD-10-CM

## 2020-02-27 DIAGNOSIS — E78.5 DYSLIPIDEMIA: ICD-10-CM

## 2020-02-27 DIAGNOSIS — I10 ESSENTIAL HYPERTENSION: ICD-10-CM

## 2020-02-27 DIAGNOSIS — J30.2 SEASONAL ALLERGIES: ICD-10-CM

## 2020-02-27 LAB
25(OH)D3 SERPL-MCNC: 59 NG/ML (ref 30–100)
ALBUMIN SERPL BCP-MCNC: 3.3 G/DL (ref 3.5–5)
ALP SERPL-CCNC: 80 U/L (ref 46–116)
ALT SERPL W P-5'-P-CCNC: 19 U/L (ref 12–78)
ANION GAP SERPL CALCULATED.3IONS-SCNC: 5 MMOL/L (ref 4–13)
AST SERPL W P-5'-P-CCNC: 12 U/L (ref 5–45)
BASOPHILS # BLD AUTO: 0.06 THOUSANDS/ΜL (ref 0–0.1)
BASOPHILS NFR BLD AUTO: 1 % (ref 0–1)
BILIRUB SERPL-MCNC: 0.39 MG/DL (ref 0.2–1)
BUN SERPL-MCNC: 28 MG/DL (ref 5–25)
CALCIUM SERPL-MCNC: 9 MG/DL (ref 8.3–10.1)
CHLORIDE SERPL-SCNC: 107 MMOL/L (ref 100–108)
CHOLEST SERPL-MCNC: 203 MG/DL (ref 50–200)
CO2 SERPL-SCNC: 27 MMOL/L (ref 21–32)
CREAT SERPL-MCNC: 1.11 MG/DL (ref 0.6–1.3)
EOSINOPHIL # BLD AUTO: 0.31 THOUSAND/ΜL (ref 0–0.61)
EOSINOPHIL NFR BLD AUTO: 6 % (ref 0–6)
ERYTHROCYTE [DISTWIDTH] IN BLOOD BY AUTOMATED COUNT: 12.6 % (ref 11.6–15.1)
GFR SERPL CREATININE-BSD FRML MDRD: 43 ML/MIN/1.73SQ M
GLUCOSE P FAST SERPL-MCNC: 85 MG/DL (ref 65–99)
HCT VFR BLD AUTO: 36.9 % (ref 34.8–46.1)
HDLC SERPL-MCNC: 66 MG/DL
HGB BLD-MCNC: 12 G/DL (ref 11.5–15.4)
IMM GRANULOCYTES # BLD AUTO: 0.01 THOUSAND/UL (ref 0–0.2)
IMM GRANULOCYTES NFR BLD AUTO: 0 % (ref 0–2)
LDLC SERPL CALC-MCNC: 122 MG/DL (ref 0–100)
LYMPHOCYTES # BLD AUTO: 1.88 THOUSANDS/ΜL (ref 0.6–4.47)
LYMPHOCYTES NFR BLD AUTO: 35 % (ref 14–44)
MCH RBC QN AUTO: 29.2 PG (ref 26.8–34.3)
MCHC RBC AUTO-ENTMCNC: 32.5 G/DL (ref 31.4–37.4)
MCV RBC AUTO: 90 FL (ref 82–98)
MONOCYTES # BLD AUTO: 0.34 THOUSAND/ΜL (ref 0.17–1.22)
MONOCYTES NFR BLD AUTO: 6 % (ref 4–12)
NEUTROPHILS # BLD AUTO: 2.79 THOUSANDS/ΜL (ref 1.85–7.62)
NEUTS SEG NFR BLD AUTO: 52 % (ref 43–75)
NONHDLC SERPL-MCNC: 137 MG/DL
NRBC BLD AUTO-RTO: 0 /100 WBCS
PLATELET # BLD AUTO: 158 THOUSANDS/UL (ref 149–390)
PMV BLD AUTO: 11.5 FL (ref 8.9–12.7)
POTASSIUM SERPL-SCNC: 4.1 MMOL/L (ref 3.5–5.3)
PROT SERPL-MCNC: 7.6 G/DL (ref 6.4–8.2)
RBC # BLD AUTO: 4.11 MILLION/UL (ref 3.81–5.12)
SODIUM SERPL-SCNC: 139 MMOL/L (ref 136–145)
T4 FREE SERPL-MCNC: 0.94 NG/DL (ref 0.76–1.46)
TRIGL SERPL-MCNC: 77 MG/DL
TSH SERPL DL<=0.05 MIU/L-ACNC: 5.68 UIU/ML (ref 0.36–3.74)
WBC # BLD AUTO: 5.39 THOUSAND/UL (ref 4.31–10.16)

## 2020-02-27 PROCEDURE — 84439 ASSAY OF FREE THYROXINE: CPT

## 2020-02-27 PROCEDURE — 84443 ASSAY THYROID STIM HORMONE: CPT

## 2020-02-27 PROCEDURE — 80061 LIPID PANEL: CPT

## 2020-02-27 PROCEDURE — 36415 COLL VENOUS BLD VENIPUNCTURE: CPT

## 2020-02-27 PROCEDURE — 80053 COMPREHEN METABOLIC PANEL: CPT

## 2020-02-27 PROCEDURE — 85025 COMPLETE CBC W/AUTO DIFF WBC: CPT

## 2020-02-27 PROCEDURE — 82306 VITAMIN D 25 HYDROXY: CPT

## 2020-03-05 ENCOUNTER — OFFICE VISIT (OUTPATIENT)
Dept: FAMILY MEDICINE CLINIC | Facility: CLINIC | Age: 85
End: 2020-03-05
Payer: MEDICARE

## 2020-03-05 VITALS
HEART RATE: 86 BPM | SYSTOLIC BLOOD PRESSURE: 150 MMHG | BODY MASS INDEX: 31.94 KG/M2 | RESPIRATION RATE: 16 BRPM | TEMPERATURE: 98.1 F | WEIGHT: 142 LBS | HEIGHT: 56 IN | OXYGEN SATURATION: 96 % | DIASTOLIC BLOOD PRESSURE: 62 MMHG

## 2020-03-05 DIAGNOSIS — I73.9 PERIPHERAL VASCULAR DISEASE (HCC): ICD-10-CM

## 2020-03-05 DIAGNOSIS — I10 ESSENTIAL HYPERTENSION: ICD-10-CM

## 2020-03-05 DIAGNOSIS — E03.8 SUBCLINICAL HYPOTHYROIDISM: ICD-10-CM

## 2020-03-05 DIAGNOSIS — Z23 ENCOUNTER FOR IMMUNIZATION: ICD-10-CM

## 2020-03-05 DIAGNOSIS — Z00.00 ENCOUNTER FOR MEDICARE ANNUAL WELLNESS EXAM: Primary | ICD-10-CM

## 2020-03-05 DIAGNOSIS — N18.30 CKD (CHRONIC KIDNEY DISEASE), STAGE III (HCC): ICD-10-CM

## 2020-03-05 PROCEDURE — 3008F BODY MASS INDEX DOCD: CPT | Performed by: FAMILY MEDICINE

## 2020-03-05 PROCEDURE — 1160F RVW MEDS BY RX/DR IN RCRD: CPT | Performed by: FAMILY MEDICINE

## 2020-03-05 PROCEDURE — 90471 IMMUNIZATION ADMIN: CPT

## 2020-03-05 PROCEDURE — 99214 OFFICE O/P EST MOD 30 MIN: CPT | Performed by: FAMILY MEDICINE

## 2020-03-05 PROCEDURE — 1036F TOBACCO NON-USER: CPT | Performed by: FAMILY MEDICINE

## 2020-03-05 PROCEDURE — 3077F SYST BP >= 140 MM HG: CPT | Performed by: FAMILY MEDICINE

## 2020-03-05 PROCEDURE — G0439 PPPS, SUBSEQ VISIT: HCPCS | Performed by: FAMILY MEDICINE

## 2020-03-05 PROCEDURE — 3078F DIAST BP <80 MM HG: CPT | Performed by: FAMILY MEDICINE

## 2020-03-05 PROCEDURE — 1170F FXNL STATUS ASSESSED: CPT | Performed by: FAMILY MEDICINE

## 2020-03-05 PROCEDURE — 90715 TDAP VACCINE 7 YRS/> IM: CPT

## 2020-03-05 PROCEDURE — 4040F PNEUMOC VAC/ADMIN/RCVD: CPT | Performed by: FAMILY MEDICINE

## 2020-03-05 PROCEDURE — 1125F AMNT PAIN NOTED PAIN PRSNT: CPT | Performed by: FAMILY MEDICINE

## 2020-03-05 NOTE — ASSESSMENT & PLAN NOTE
- Controlled  - Blood pressure goal per [facility] would be <150/90  - On  hctz 12 5, lisinopril 20mg daily, amlodipine 2 5mg daily  - Restrict daily salt intake up to 2 4 g  - Advised to walk 30 minutes a day 5 times a week and practice stress relieving measures

## 2020-03-05 NOTE — PROGRESS NOTES
Assessment/Plan:    CKD (chronic kidney disease), stage III  Stable  Recheck 6 months  Avoid nephrotoxic medication  Peripheral vascular disease (HCC)  Stable  No complaints  Essential hypertension  - Controlled  - Blood pressure goal per [facility] would be <150/90  - On  hctz 12 5, lisinopril 20mg daily, amlodipine 2 5mg daily  - Restrict daily salt intake up to 2 4 g  - Advised to walk 30 minutes a day 5 times a week and practice stress relieving measures         Diagnoses and all orders for this visit:    Encounter for immunization  -     TDAP VACCINE GREATER THAN OR EQUAL TO 6YO IM    Subclinical hypothyroidism  -     TSH, 3rd generation with Free T4 reflex; Future    CKD (chronic kidney disease), stage III (HCC)  -     Basic metabolic panel; Future    Peripheral vascular disease ([facility] )    Essential hypertension          Subjective:      Patient ID: Ruy Terry is a 80 y o  female  No complaints today  Taking meds as directs  Labs reviewed  BMI Counseling: Body mass index is 31 84 kg/m²  The BMI is above normal  Nutrition recommendations include decreasing portion sizes, encouraging healthy choices of fruits and vegetables, decreasing fast food intake, consuming healthier snacks and limiting drinks that contain sugar  Exercise recommendations include moderate physical activity 150 minutes/week  The following portions of the patient's history were reviewed and updated as appropriate:   She   Patient Active Problem List    Diagnosis Date Noted    Peripheral vascular disease ([facility] ) 02/28/2019    CKD (chronic kidney disease), stage III ([facility] ) 08/03/2017    Seasonal allergies 10/14/2016    Frail elderly 07/31/2015    Onychomycosis 11/18/2014    Dyslipidemia 06/25/2012    Essential hypertension 06/25/2012    Osteoarthritis 06/25/2012     She  has a past surgical history that includes Cataract extraction (07/2005);  Knee Arthroplasty (Bilateral); and Replacement total knee (Bilateral, 2004)  Her family history includes Diabetes in her mother; Emphysema in her father; Hypertension in her sister  She  reports that she has never smoked  She has never used smokeless tobacco  She reports that she does not drink alcohol or use drugs  Current Outpatient Medications   Medication Sig Dispense Refill    amLODIPine (NORVASC) 2 5 mg tablet TAKE 1 TABLET (2 5 MG TOTAL) BY MOUTH DAILY 90 tablet 0    Calcium Carbonate (CALTRATE 600) 1500 (600 Ca) MG TABS Take 1 tablet by mouth daily      Cholecalciferol (VITAMIN D) 2000 units CAPS Take 1 tablet by mouth daily      GLUCOSAMINE CHONDROITIN COMPLX PO Take 1 capsule by mouth daily      hydrochlorothiazide (MICROZIDE) 12 5 mg capsule TAKE 1 CAPSULE EVERY DAY 90 capsule 0    lisinopril (ZESTRIL) 20 mg tablet TAKE 1 TABLET EVERY DAY 90 tablet 1    Multiple Vitamin (MULTI-VITAMIN DAILY PO) Take by mouth daily      Omega-3 Fatty Acids (FISH OIL) 1,000 mg Take 2 capsules by mouth daily      traMADol (ULTRAM) 50 mg tablet Take 1 tablet (50 mg total) by mouth every 12 (twelve) hours as needed for severe pain (for pain) 60 tablet 0    Vit V-Lwkvmbu-Xcjitw-Bromelain (VITAMIN C-QUERCETIN-CITRUS BIO) [phone] MG CAPS Take by mouth       No current facility-administered medications for this visit       Review of Systems   Constitutional: Negative for fever and unexpected weight change  HENT: Negative for ear pain, sore throat and trouble swallowing  Eyes: Negative for pain and visual disturbance  Respiratory: Negative for cough, chest tightness, shortness of breath and wheezing  Cardiovascular: Negative for chest pain  Gastrointestinal: Negative for abdominal distention, abdominal pain, blood in stool, constipation, diarrhea, nausea and vomiting  Endocrine: Negative for polydipsia and polyuria  Genitourinary: Negative for dysuria and hematuria  Musculoskeletal: Negative for back pain and myalgias  Skin: Negative for rash  Neurological: Negative for syncope and headaches  Psychiatric/Behavioral: Negative for suicidal ideas  PHQ-9 Depression Screening    PHQ-9:    Frequency of the following problems over the past two weeks:       Little interest or pleasure in doing things:  0 - not at all  Feeling down, depressed, or hopeless:  0 - not at all  PHQ-2 Score:  0           Objective:      /62 (BP Location: Left arm, Patient Position: Sitting, Cuff Size: Adult)   Pulse 86   Temp 98 1 °F (36 7 °C) (Tympanic)   Resp 16   Ht 4' 8" (1 422 m)   Wt 64 4 kg (142 lb)   SpO2 96%   BMI 31 84 kg/m²          Physical Exam   Constitutional: She is oriented to person, place, and time  She appears well-developed and well-nourished  HENT:   Head: Normocephalic and atraumatic  Right Ear: External ear normal    Left Ear: External ear normal    Mouth/Throat: No oropharyngeal exudate  Eyes: Pupils are equal, round, and reactive to light  Conjunctivae and EOM are normal  No scleral icterus  Neck: Normal range of motion  Neck supple  Cardiovascular: Normal rate, regular rhythm and intact distal pulses  Exam reveals no gallop and no friction rub  No murmur heard  Pulmonary/Chest: Effort normal and breath sounds normal  No respiratory distress  She has no wheezes  She has no rales  Abdominal: Soft  Bowel sounds are normal  She exhibits no distension and no mass  There is no tenderness  There is no rebound  Musculoskeletal: Normal range of motion  She exhibits no edema  Neurological: She is alert and oriented to person, place, and time  Skin: Skin is warm and dry  Psychiatric: She has a normal mood and affect

## 2020-03-05 NOTE — PROGRESS NOTES
Assessment and Plan:     Problem List Items Addressed This Visit     None        BMI Counseling: Body mass index is 31 84 kg/m²  The BMI is above normal  Nutrition recommendations include decreasing portion sizes, encouraging healthy choices of fruits and vegetables, decreasing fast food intake, consuming healthier snacks and limiting drinks that contain sugar  Exercise recommendations include moderate physical activity 150 minutes/week  Preventive health issues were discussed with patient, and age appropriate screening tests were ordered as noted in patient's After Visit Summary  Personalized health advice and appropriate referrals for health education or preventive services given if needed, as noted in patient's After Visit Summary  History of Present Illness:     Patient presents for Medicare Annual Wellness visit    Patient Care Team:  Jazlyn George MD as PCP - General (Family Medicine)  Deshawn Johnson DPM     Problem List:     Patient Active Problem List   Diagnosis    CKD [facility]chronic [facility] disease), [facility] III ([facility] )    Dyslipidemia    Frail elderly    Essential hypertension    Osteoarthritis    Seasonal allergies    [facility]    Onychomycosis      Past Medical and Surgical History:     Past Medical History:   Diagnosis Date    Chronic renal failure     Last assessed 11/11/13    Impaired fasting glucose     [facility]termitt[facility]audication [facility] )     Last assessed 8/18/16    Vitamin D deficiency     Last assessed 08/05/13     Past Surgical History:   Procedure Laterality Date    CATARACT EXTRACTION  07/2005    KNEE ARTHROPLASTY Bilateral     REPLACEMENT TOTAL KNEE Bilateral 2004    knee replacement in R &L knee      Family History:     Family History   Problem Relation Age of Onset    Diabetes Mother     Emphysema Father     Hypertension Sister       Social History:        Social History     Socioeconomic History    Marital status:       Spouse name: None  Number of children: None    Years of education: None    Highest education level: None   Occupational History    None   Social Needs    Financial resource strain: None    Food insecurity:     Worry: Never true     Inability: Never true    Transportation needs:     Medical: No     Non-medical: No   Tobacco Use    Smoking status: Never Smoker    Smokeless tobacco: Never Used   Substance and Sexual Activity    Alcohol use: No    Drug use: No    Sexual activity: None   Lifestyle    Physical activity:     Days per week: 0 days     Minutes per session: 0 min    Stress: Not at all   Relationships    Social connections:     Talks on phone: Patient refused     Gets together: Patient refused     Attends [employer] service: Patient refused     Active member of club or organization: Patient refused     Attends meetings of clubs or organizations: Patient refused     Relationship status: Patient refused    Intimate partner violence:     Fear of current or ex partner: No     Emotionally abused: No     Physically abused: No     Forced sexual activity: No   Other Topics Concern    None   Social History Narrative    Brushes teeth daily    Copy of advanced directive obtained    Does not exercise      Medications and Allergies:     Current Outpatient Medications   Medication Sig Dispense Refill    amLODIPine (NORVASC) 2 5 mg tablet TAKE 1 TABLET (2 5 MG TOTAL) BY MOUTH DAILY 90 tablet 0    Calcium Carbonate (CALTRATE 600) 1500 (600 Ca) MG TABS Take 1 tablet by mouth daily      Cholecalciferol (VITAMIN D) 2000 units CAPS Take 1 tablet by mouth daily      GLUCOSAMINE CHONDROITIN COMPLX PO Take 1 capsule by mouth daily      hydrochlorothiazide (MICROZIDE) 12 5 mg capsule TAKE 1 CAPSULE EVERY DAY 90 capsule 0    lisinopril (ZESTRIL) 20 mg tablet TAKE 1 TABLET EVERY DAY 90 tablet 1    Multiple Vitamin (MULTI-VITAMIN DAILY PO) Take by mouth daily      Omega-3 Fatty Acids (FISH OIL) 1,000 mg Take 2 capsules by mouth daily      traMADol (ULTRAM) 50 mg tablet Take 1 tablet (50 mg total) by mouth every 12 (twelve) hours as needed for severe pain (for pain) 60 tablet 0    Vit M-Kxjzcxh-Seougj-Bromelain (VITAMIN C-QUERCETIN-CITRUS BIO) [phone] MG CAPS Take by mouth       No current facility-administered medications for this visit  No Known Allergies   Immunizations:     Immunization History   Administered Date(s) Administered    INFLUENZA 10/19/2016, 10/06/2017    Influenza Split High Dose Preservative Free IM 11/08/2013, 09/15/2014, 10/12/2015, 10/19/2016, 10/06/2017    Influenza TIV (IM) 11/29/2002, 11/29/2002    Influenza, high dose seasonal 0 5 mL 09/24/2018, 10/03/2019    Pneumococcal Conjugate 13-Valent 09/24/2018    Pneumococcal Polysaccharide PPV23 11/29/2002, 11/29/2012, 10/28/2015      Health Maintenance:         Topic Date Due    DXA SCAN  08/30/2020         Topic Date Due    DTaP,Tdap,and Td Vaccines (1 - Tdap) 05/30/1936      Medicare Health Risk Assessment:     /62 (BP Location: Left arm, Patient Position: Sitting, Cuff Size: Adult)   Pulse 86   Temp 98 1 °F (36 7 °C) (Tympanic)   Resp 16   Ht 4' 8" (1 422 m)   Wt 64 4 kg (142 lb)   SpO2 96%   BMI 31 84 kg/m²      Ashely Fallow is here for her Subsequent Wellness visit  Last Medicare Wellness visit information reviewed, patient interviewed, no change since last AWV  Health Risk Assessment:   Patient rates overall health as very good  Patient feels that their physical health rating is same  Eyesight was rated as same  Hearing was rated as slightly worse  Patient feels that their emotional and mental health rating is same  Pain experienced in the last 7 days has been none  Depression Screening:   PHQ-2 Score: 0      Fall Risk Screening: In the past year, patient has experienced: no history of falling in past year      Urinary Incontinence Screening:   Patient has not leaked urine accidently in the last six months       Home Safety:  Patient does not have trouble with stairs inside or outside of their home  Patient has working smoke alarms and has working carbon monoxide detector  Home safety hazards include: none  Nutrition:   Current diet is Regular and No Added Salt  Medications:   Patient is not currently taking any over-the-counter supplements  Patient is able to manage medications  Activities of Daily Living (ADLs)/Instrumental Activities of Daily Living (IADLs):   Walk and transfer into and out of bed and chair?: Yes  Dress and groom yourself?: Yes    Bathe or shower yourself?: Yes    Feed yourself? Yes  Do your laundry/housekeeping?: No  Manage your money, pay your bills and track your expenses?: No  Make your own meals?: Yes    Do your own shopping?: No    Previous Hospitalizations:   Any hospitalizations or ED visits within the last 12 months?: No      Advance Care Planning:   Living will: Yes    Advanced directive: Yes      Cognitive Screening:   Provider or family/friend/caregiver concerned regarding cognition?: No    PREVENTIVE SCREENINGS      Cardiovascular Screening:    General: Screening Current      Diabetes Screening:     General: Screening Current      Colorectal Cancer Screening:     General: Screening Not Indicated      Breast Cancer Screening:     General: Screening Not Indicated      Cervical Cancer Screening:    General: Screening Not Indicated      Osteoporosis Screening:    General: Screening Current      Lung Cancer Screening:     General: Screening Not Indicated      Hepatitis C Screening:    General: Screening Not Indicated    Other Counseling Topics:   Alcohol use counseling, car/seat belt/driving safety and calcium and vitamin D intake and regular weightbearing exercise         Dianna Johnston MD

## 2020-03-05 NOTE — PATIENT INSTRUCTIONS
Medicare Preventive Visit Patient Instructions  Thank you for completing your Welcome to Medicare Visit or Medicare Annual Wellness Visit today  Your next wellness visit will be due in one year (3/5/2021)  The screening/preventive services that you may require over the next 5-10 years are detailed below  Some tests may not apply to you based off risk factors and/or age  Screening tests ordered at today's visit but not completed yet may show as past due  Also, please note that scanned in results may not display below  Preventive Screenings:  Service Recommendations Previous Testing/Comments   Colorectal Cancer Screening  * Colonoscopy    * Fecal Occult Blood Test (FOBT)/Fecal Immunochemical Test (FIT)  * Fecal DNA/Cologuard Test  * Flexible Sigmoidoscopy Age: 54-65 years old   Colonoscopy: every 10 years (may be performed more frequently if at higher risk)  OR  FOBT/FIT: every 1 year  OR  Cologuard: every 3 years  OR  Sigmoidoscopy: every 5 years  Screening may be recommended earlier than age 48 if at higher risk for colorectal cancer  Also, an individualized decision between you and your healthcare provider will decide whether screening between the ages of 74-80 would be appropriate  Colonoscopy: Not on file  FOBT/FIT: Not on file  Cologuard: Not on file  Sigmoidoscopy: Not on file    Screening Not Indicated     Breast Cancer Screening Age: 36 years old  Frequency: every 1-2 years  Not required if history of left and right mastectomy Mammogram: Not on file       Cervical Cancer Screening Between the ages of 21-29, pap smear recommended once every 3 years  Between the ages of 33-67, can perform pap smear with HPV co-testing every 5 years     Recommendations may differ for women with a history of total hysterectomy, cervical cancer, or abnormal pap smears in past  Pap Smear: Not on file    Screening Not Indicated   Hepatitis C Screening Once for adults born between 1945 and 1965  More frequently in patients at high risk for Hepatitis C Hep C Antibody: Not on file       Diabetes Screening 1-2 times per year if you're at risk for diabetes or have pre-diabetes Fasting glucose: 85 mg/dL   A1C: No results in last 5 years    Screening Current   Cholesterol Screening Once every 5 years if you don't have a lipid disorder  May order more often based on risk factors  Lipid panel: 02/27/2020    Screening Current     Other Preventive Screenings Covered by Medicare:  1  Abdominal Aortic Aneurysm (AAA) Screening: covered once if your at risk  You're considered to be at risk if you have a family history of AAA  2  Lung Cancer Screening: covers low dose CT scan once per year if you meet all of the following conditions: (1) Age 50-69; (2) No signs or symptoms of lung cancer; (3) Current smoker or have quit smoking within the last 15 years; (4) You have a tobacco smoking history of at least 30 pack years (packs per day multiplied by number of years you smoked); (5) You get a written order from a healthcare provider  3  Glaucoma Screening: covered annually if you're considered high risk: (1) You have diabetes OR (2) Family history of glaucoma OR (3)  aged 48 and older OR (3)  American aged 72 and older  3  Osteoporosis Screening: covered every 2 years if you meet one of the following conditions: (1) You're estrogen deficient and at risk for osteoporosis based off medical history and other findings; (2) Have a vertebral abnormality; (3) On glucocorticoid therapy for more than 3 months; (4) Have primary hyperparathyroidism; (5) On osteoporosis medications and need to assess response to drug therapy  · Last bone density test (DXA Scan): 08/30/2018  5  HIV Screening: covered annually if you're between the age of 12-76  Also covered annually if you are younger than 13 and older than 72 with risk factors for HIV infection   For pregnant patients, it is covered up to 3 times per pregnancy  Immunizations:  Immunization Recommendations   Influenza Vaccine Annual influenza vaccination during flu season is recommended for all persons aged >= 6 months who do not have contraindications   Pneumococcal Vaccine (Prevnar and Pneumovax)  * Prevnar = PCV13  * Pneumovax = PPSV23   Adults 25-60 years old: 1-3 doses may be recommended based on certain risk factors  Adults 72 years old: Prevnar (PCV13) vaccine recommended followed by Pneumovax (PPSV23) vaccine  If already received PPSV23 since turning 65, then PCV13 recommended at least one year after PPSV23 dose  Hepatitis B Vaccine 3 dose series if at intermediate or high risk (ex: diabetes, end stage renal disease, liver disease)   Tetanus (Td) Vaccine - COST NOT COVERED BY MEDICARE PART B Following completion of primary series, a booster dose should be given every 10 years to maintain immunity against tetanus  Td may also be given as tetanus wound prophylaxis  Tdap Vaccine - COST NOT COVERED BY MEDICARE PART B Recommended at least once for all adults  For pregnant patients, recommended with each pregnancy  Shingles Vaccine (Shingrix) - COST NOT COVERED BY MEDICARE PART B  2 shot series recommended in those aged 48 and above     Health Maintenance Due:      Topic Date Due    DXA SCAN  08/30/2020     Immunizations Due:      Topic Date Due    DTaP,Tdap,and Td Vaccines (1 - Tdap) 05/30/1936     Advance Directives   What are advance directives? Advance directives are legal documents that state your wishes and plans for medical care  These plans are made ahead of time in case you lose your ability to make decisions for yourself  Advance directives can apply to any medical decision, such as the treatments you want, and if you want to donate organs  What are the types of advance directives? There are many types of advance directives, and each state has rules about how to use them   You may choose a combination of any of the following:  · Living will:  This is a written record of the treatment you want  You can also choose which treatments you do not want, which to limit, and which to stop at a certain time  This includes surgery, medicine, IV fluid, and tube feedings  · Durable power of  for healthcare [facility]): This is a written record that states who you want to make healthcare choices for you when you are unable to make them for yourself  This person, called a proxy, is usually a family member or a friend  You may choose more than 1 proxy  · Do not resuscitate (DNR) order:  A DNR order is used in case your heart stops beating or you stop breathing  It is a request not to have certain forms of treatment, such as CPR  A DNR order may be included in other types of advance directives  · Medical directive: This covers the care that you want if you are in a coma, near death, or unable to make decisions for yourself  You can list the treatments you want for each condition  Treatment may include pain medicine, surgery, blood transfusions, dialysis, IV or tube feedings, and a ventilator (breathing machine)  · Values history: This document has questions about your views, beliefs, and how you feel and think about life  This information can help others choose the care that you would choose  Why are advance directives important? An advance directive helps you control your care  Although spoken wishes may be used, it is better to have your wishes written down  Spoken wishes can be misunderstood, or not followed  Treatments may be given even if you do not want them  An advance directive may make it easier for your family to make difficult choices about your care  Weight Management   Why it is important to manage your weight:  Being overweight increases your risk of health conditions such as heart disease, high blood pressure, type 2 diabetes, and certain types of cancer   It can also increase your risk for osteoarthritis, sleep apnea, and other respiratory problems  Aim for a slow, steady weight loss  Even a small amount of weight loss can lower your risk of health problems  How to lose weight safely:  A safe and healthy way to lose weight is to eat fewer calories and get regular exercise  You can lose up about 1 pound a week by decreasing the number of calories you eat by 500 calories each day  Healthy meal plan for weight management:  A healthy meal plan includes a variety of foods, contains fewer calories, and helps you stay healthy  A healthy meal plan includes the following:  · Eat whole-grain foods more often  A healthy meal plan should contain fiber  Fiber is the part of grains, fruits, and vegetables that is not broken down by your body  Whole-grain foods are healthy and provide extra fiber in your diet  Some examples of whole-grain foods are whole-wheat breads and pastas, oatmeal, brown rice, and bulgur  · Eat a variety of vegetables every day  Include dark, leafy greens such as spinach, kale, maame greens, and mustard greens  Eat yellow and orange vegetables such as carrots, sweet potatoes, and winter squash  · Eat a variety of fruits every day  Choose fresh or canned fruit (canned in its own juice or light syrup) instead of juice  Fruit juice has very little or no fiber  · Eat low-fat dairy foods  Drink fat-free (skim) milk or 1% milk  Eat fat-free yogurt and low-fat cottage cheese  Try low-fat cheeses such as mozzarella and other reduced-fat cheeses  · Choose meat and other protein foods that are low in fat  Choose beans or other legumes such as split peas or lentils  Choose fish, skinless poultry (chicken or turkey), or lean cuts of red meat (beef or pork)  Before you cook meat or poultry, cut off any visible fat  · Use less fat and oil  Try baking foods instead of frying them  Add less fat, such as margarine, sour cream, regular salad dressing and mayonnaise to foods  Eat fewer high-fat foods   Some examples of high-fat foods include french fries, doughnuts, ice cream, and cakes  · Eat fewer sweets  Limit foods and drinks that are high in sugar  This includes candy, cookies, regular soda, and sweetened drinks  Exercise:  Exercise at least 30 minutes per day on most days of the week  Some examples of exercise include walking, biking, dancing, and swimming  You can also fit in more physical activity by taking the stairs instead of the elevator or parking farther away from stores  Ask your healthcare provider about the best exercise plan for you  © Copyright [employer] 2018 Information is for End User's use only and may not be sold, redistributed or otherwise used for commercial purposes   All illustrations and images included in CareNotes® are the copyrighted property of A D A M , Inc  or [address] [place], [place] [address]kandi angeline

## 2020-03-09 DIAGNOSIS — M19.90 OSTEOARTHRITIS, UNSPECIFIED OSTEOARTHRITIS TYPE, UNSPECIFIED SITE: ICD-10-CM

## 2020-03-09 DIAGNOSIS — M54.2 CERVICALGIA: ICD-10-CM

## 2020-03-09 RX ORDER — TRAMADOL HYDROCHLORIDE 50 MG/1
50 TABLET ORAL EVERY 12 HOURS PRN
Qty: 60 TABLET | Refills: 0 | Status: SHIPPED | OUTPATIENT
Start: 2020-03-09 | End: 2020-05-11 | Stop reason: SDUPTHER

## 2020-03-23 DIAGNOSIS — I10 ESSENTIAL HYPERTENSION: ICD-10-CM

## 2020-03-24 RX ORDER — AMLODIPINE BESYLATE 2.5 MG/1
2.5 TABLET ORAL DAILY
Qty: 90 TABLET | Refills: 1 | Status: SHIPPED | OUTPATIENT
Start: 2020-03-24 | End: 2020-09-21 | Stop reason: SDUPTHER

## 2020-04-17 DIAGNOSIS — I10 HYPERTENSION, UNSPECIFIED TYPE: ICD-10-CM

## 2020-04-20 RX ORDER — HYDROCHLOROTHIAZIDE 12.5 MG/1
CAPSULE, GELATIN COATED ORAL
Qty: 90 CAPSULE | Refills: 0 | Status: SHIPPED | OUTPATIENT
Start: 2020-04-20 | End: 2020-06-10

## 2020-05-11 DIAGNOSIS — M19.90 OSTEOARTHRITIS, UNSPECIFIED OSTEOARTHRITIS TYPE, UNSPECIFIED SITE: ICD-10-CM

## 2020-05-11 DIAGNOSIS — M54.2 CERVICALGIA: ICD-10-CM

## 2020-05-11 RX ORDER — TRAMADOL HYDROCHLORIDE 50 MG/1
50 TABLET ORAL EVERY 12 HOURS PRN
Qty: 60 TABLET | Refills: 0 | Status: SHIPPED | OUTPATIENT
Start: 2020-05-11

## 2020-06-10 DIAGNOSIS — I10 HYPERTENSION, UNSPECIFIED TYPE: ICD-10-CM

## 2020-06-10 RX ORDER — HYDROCHLOROTHIAZIDE 12.5 MG/1
CAPSULE, GELATIN COATED ORAL
Qty: 90 CAPSULE | Refills: 0 | Status: SHIPPED | OUTPATIENT
Start: 2020-06-10 | End: 2020-08-17

## 2020-07-06 DIAGNOSIS — I10 HYPERTENSION, UNSPECIFIED TYPE: ICD-10-CM

## 2020-07-06 RX ORDER — LISINOPRIL 20 MG/1
20 TABLET ORAL DAILY
Qty: 90 TABLET | Refills: 1 | Status: SHIPPED | OUTPATIENT
Start: 2020-07-06 | End: 2020-11-11

## 2020-08-17 DIAGNOSIS — I10 HYPERTENSION, UNSPECIFIED TYPE: ICD-10-CM

## 2020-08-17 RX ORDER — HYDROCHLOROTHIAZIDE 12.5 MG/1
CAPSULE, GELATIN COATED ORAL
Qty: 90 CAPSULE | Refills: 0 | Status: SHIPPED | OUTPATIENT
Start: 2020-08-17 | End: 2020-11-02

## 2020-08-19 DIAGNOSIS — I10 ESSENTIAL HYPERTENSION: ICD-10-CM

## 2020-08-19 RX ORDER — AMLODIPINE BESYLATE 2.5 MG/1
2.5 TABLET ORAL DAILY
Qty: 90 TABLET | Refills: 1 | OUTPATIENT
Start: 2020-08-19

## 2020-09-03 ENCOUNTER — APPOINTMENT (OUTPATIENT)
Dept: LAB | Facility: HOSPITAL | Age: 85
End: 2020-09-03
Payer: MEDICARE

## 2020-09-03 DIAGNOSIS — E03.8 SUBCLINICAL HYPOTHYROIDISM: ICD-10-CM

## 2020-09-03 DIAGNOSIS — N18.30 CKD (CHRONIC KIDNEY DISEASE), STAGE III (HCC): ICD-10-CM

## 2020-09-03 LAB
ANION GAP SERPL CALCULATED.3IONS-SCNC: 7 MMOL/L (ref 4–13)
BUN SERPL-MCNC: 29 MG/DL (ref 5–25)
CALCIUM SERPL-MCNC: 9.4 MG/DL (ref 8.3–10.1)
CHLORIDE SERPL-SCNC: 109 MMOL/L (ref 100–108)
CO2 SERPL-SCNC: 24 MMOL/L (ref 21–32)
CREAT SERPL-MCNC: 1.23 MG/DL (ref 0.6–1.3)
GFR SERPL CREATININE-BSD FRML MDRD: 37 ML/MIN/1.73SQ M
GLUCOSE P FAST SERPL-MCNC: 92 MG/DL (ref 65–99)
POTASSIUM SERPL-SCNC: 4.3 MMOL/L (ref 3.5–5.3)
SODIUM SERPL-SCNC: 140 MMOL/L (ref 136–145)
T4 FREE SERPL-MCNC: 0.93 NG/DL (ref 0.76–1.46)
TSH SERPL DL<=0.05 MIU/L-ACNC: 5.03 UIU/ML (ref 0.36–3.74)

## 2020-09-03 PROCEDURE — 80048 BASIC METABOLIC PNL TOTAL CA: CPT

## 2020-09-03 PROCEDURE — 84443 ASSAY THYROID STIM HORMONE: CPT

## 2020-09-03 PROCEDURE — 36415 COLL VENOUS BLD VENIPUNCTURE: CPT

## 2020-09-03 PROCEDURE — 84439 ASSAY OF FREE THYROXINE: CPT

## 2020-09-21 DIAGNOSIS — I10 ESSENTIAL HYPERTENSION: ICD-10-CM

## 2020-09-21 RX ORDER — AMLODIPINE BESYLATE 2.5 MG/1
2.5 TABLET ORAL DAILY
Qty: 90 TABLET | Refills: 1 | Status: SHIPPED | OUTPATIENT
Start: 2020-09-21

## 2020-11-02 ENCOUNTER — OFFICE VISIT (OUTPATIENT)
Dept: FAMILY MEDICINE CLINIC | Facility: CLINIC | Age: 85
End: 2020-11-02
Payer: MEDICARE

## 2020-11-02 VITALS
SYSTOLIC BLOOD PRESSURE: 148 MMHG | OXYGEN SATURATION: 98 % | WEIGHT: 145 LBS | TEMPERATURE: 96.6 F | DIASTOLIC BLOOD PRESSURE: 70 MMHG | BODY MASS INDEX: 32.62 KG/M2 | HEIGHT: 56 IN | HEART RATE: 87 BPM

## 2020-11-02 DIAGNOSIS — Z78.0 OSTEOPENIA AFTER MENOPAUSE: Primary | ICD-10-CM

## 2020-11-02 DIAGNOSIS — N18.32 STAGE 3B CHRONIC KIDNEY DISEASE (HCC): ICD-10-CM

## 2020-11-02 DIAGNOSIS — Z23 ENCOUNTER FOR VACCINATION: ICD-10-CM

## 2020-11-02 DIAGNOSIS — E78.5 DYSLIPIDEMIA: ICD-10-CM

## 2020-11-02 DIAGNOSIS — I10 ESSENTIAL HYPERTENSION: ICD-10-CM

## 2020-11-02 DIAGNOSIS — I10 HYPERTENSION, UNSPECIFIED TYPE: ICD-10-CM

## 2020-11-02 DIAGNOSIS — R44.3 HALLUCINATIONS: ICD-10-CM

## 2020-11-02 DIAGNOSIS — M85.80 OSTEOPENIA AFTER MENOPAUSE: Primary | ICD-10-CM

## 2020-11-02 DIAGNOSIS — Z78.0 POSTMENOPAUSAL: ICD-10-CM

## 2020-11-02 PROCEDURE — G0008 ADMIN INFLUENZA VIRUS VAC: HCPCS

## 2020-11-02 PROCEDURE — 99214 OFFICE O/P EST MOD 30 MIN: CPT | Performed by: FAMILY MEDICINE

## 2020-11-02 PROCEDURE — 90662 IIV NO PRSV INCREASED AG IM: CPT

## 2020-11-02 RX ORDER — HYDROCHLOROTHIAZIDE 12.5 MG/1
CAPSULE, GELATIN COATED ORAL
Qty: 90 CAPSULE | Refills: 0 | Status: SHIPPED | OUTPATIENT
Start: 2020-11-02

## 2020-11-11 DIAGNOSIS — I10 HYPERTENSION, UNSPECIFIED TYPE: ICD-10-CM

## 2020-11-11 RX ORDER — LISINOPRIL 20 MG/1
TABLET ORAL
Qty: 90 TABLET | Refills: 1 | Status: SHIPPED | OUTPATIENT
Start: 2020-11-11

## 2020-12-13 ENCOUNTER — HOSPITAL ENCOUNTER (EMERGENCY)
Facility: HOSPITAL | Age: 85
Discharge: HOME | End: 2020-12-13
Attending: EMERGENCY MEDICINE | Admitting: EMERGENCY MEDICINE
Payer: MEDICARE

## 2020-12-13 ENCOUNTER — APPOINTMENT (EMERGENCY)
Dept: RADIOLOGY | Facility: HOSPITAL | Age: 85
End: 2020-12-13
Payer: MEDICARE

## 2020-12-13 VITALS
TEMPERATURE: 97.7 F | SYSTOLIC BLOOD PRESSURE: 169 MMHG | RESPIRATION RATE: 16 BRPM | OXYGEN SATURATION: 99 % | HEART RATE: 77 BPM | DIASTOLIC BLOOD PRESSURE: 72 MMHG

## 2020-12-13 DIAGNOSIS — R44.3 HALLUCINATIONS: Primary | ICD-10-CM

## 2020-12-13 LAB
ANION GAP SERPL CALCULATED.3IONS-SCNC: 6 MMOL/L (ref 4–13)
BACTERIA UR QL AUTO: NORMAL /HPF
BASOPHILS # BLD AUTO: 0.03 THOUSANDS/ΜL (ref 0–0.1)
BASOPHILS NFR BLD AUTO: 1 % (ref 0–1)
BILIRUB UR QL STRIP: NEGATIVE
BUN SERPL-MCNC: 18 MG/DL (ref 5–25)
CALCIUM SERPL-MCNC: 10 MG/DL (ref 8.3–10.1)
CHLORIDE SERPL-SCNC: 106 MMOL/L (ref 100–108)
CLARITY UR: ABNORMAL
CLARITY, POC: NORMAL
CO2 SERPL-SCNC: 27 MMOL/L (ref 21–32)
COLOR UR: YELLOW
COLOR, POC: YELLOW
CREAT SERPL-MCNC: 1.22 MG/DL (ref 0.6–1.3)
EOSINOPHIL # BLD AUTO: 0.03 THOUSAND/ΜL (ref 0–0.61)
EOSINOPHIL NFR BLD AUTO: 1 % (ref 0–6)
ERYTHROCYTE [DISTWIDTH] IN BLOOD BY AUTOMATED COUNT: 12 % (ref 11.6–15.1)
GFR SERPL CREATININE-BSD FRML MDRD: 38 ML/MIN/1.73SQ M
GLUCOSE SERPL-MCNC: 115 MG/DL (ref 65–140)
GLUCOSE UR STRIP-MCNC: NEGATIVE MG/DL
HCT VFR BLD AUTO: 34.9 % (ref 34.8–46.1)
HGB BLD-MCNC: 11.6 G/DL (ref 11.5–15.4)
HGB UR QL STRIP.AUTO: ABNORMAL
IMM GRANULOCYTES # BLD AUTO: 0.02 THOUSAND/UL (ref 0–0.2)
IMM GRANULOCYTES NFR BLD AUTO: 0 % (ref 0–2)
KETONES UR STRIP-MCNC: NEGATIVE MG/DL
LEUKOCYTE ESTERASE UR QL STRIP: NEGATIVE
LYMPHOCYTES # BLD AUTO: 1.23 THOUSANDS/ΜL (ref 0.6–4.47)
LYMPHOCYTES NFR BLD AUTO: 23 % (ref 14–44)
MCH RBC QN AUTO: 29.4 PG (ref 26.8–34.3)
MCHC RBC AUTO-ENTMCNC: 33.2 G/DL (ref 31.4–37.4)
MCV RBC AUTO: 88 FL (ref 82–98)
MONOCYTES # BLD AUTO: 0.27 THOUSAND/ΜL (ref 0.17–1.22)
MONOCYTES NFR BLD AUTO: 5 % (ref 4–12)
NEUTROPHILS # BLD AUTO: 3.79 THOUSANDS/ΜL (ref 1.85–7.62)
NEUTS SEG NFR BLD AUTO: 70 % (ref 43–75)
NITRITE UR QL STRIP: NEGATIVE
NON-SQ EPI CELLS URNS QL MICRO: NORMAL /HPF
NRBC BLD AUTO-RTO: 0 /100 WBCS
PH UR STRIP.AUTO: 7 [PH] (ref 4.5–8)
PLATELET # BLD AUTO: 213 THOUSANDS/UL (ref 149–390)
PMV BLD AUTO: 11.2 FL (ref 8.9–12.7)
POTASSIUM SERPL-SCNC: 4.5 MMOL/L (ref 3.5–5.3)
PROT UR STRIP-MCNC: NEGATIVE MG/DL
RBC # BLD AUTO: 3.95 MILLION/UL (ref 3.81–5.12)
RBC #/AREA URNS AUTO: NORMAL /HPF
SODIUM SERPL-SCNC: 139 MMOL/L (ref 136–145)
SP GR UR STRIP.AUTO: 1.01 (ref 1–1.03)
UROBILINOGEN UR QL STRIP.AUTO: 0.2 E.U./DL
WBC # BLD AUTO: 5.37 THOUSAND/UL (ref 4.31–10.16)
WBC #/AREA URNS AUTO: NORMAL /HPF

## 2020-12-13 PROCEDURE — 85025 COMPLETE CBC W/AUTO DIFF WBC: CPT | Performed by: EMERGENCY MEDICINE

## 2020-12-13 PROCEDURE — 96360 HYDRATION IV INFUSION INIT: CPT

## 2020-12-13 PROCEDURE — 99284 EMERGENCY DEPT VISIT MOD MDM: CPT | Performed by: EMERGENCY MEDICINE

## 2020-12-13 PROCEDURE — 71045 X-RAY EXAM CHEST 1 VIEW: CPT

## 2020-12-13 PROCEDURE — 80048 BASIC METABOLIC PNL TOTAL CA: CPT | Performed by: EMERGENCY MEDICINE

## 2020-12-13 PROCEDURE — 36415 COLL VENOUS BLD VENIPUNCTURE: CPT | Performed by: EMERGENCY MEDICINE

## 2020-12-13 PROCEDURE — 99285 EMERGENCY DEPT VISIT HI MDM: CPT

## 2020-12-13 PROCEDURE — 81001 URINALYSIS AUTO W/SCOPE: CPT

## 2020-12-13 PROCEDURE — U0003 INFECTIOUS AGENT DETECTION BY NUCLEIC ACID (DNA OR RNA); SEVERE ACUTE RESPIRATORY SYNDROME CORONAVIRUS 2 (SARS-COV-2) (CORONAVIRUS DISEASE [COVID-19]), AMPLIFIED PROBE TECHNIQUE, MAKING USE OF HIGH THROUGHPUT TECHNOLOGIES AS DESCRIBED BY CMS-2020-01-R: HCPCS | Performed by: EMERGENCY MEDICINE

## 2020-12-13 RX ADMIN — SODIUM CHLORIDE 1000 ML: 0.9 INJECTION, SOLUTION INTRAVENOUS at 13:18

## 2020-12-14 LAB — SARS-COV-2 RNA SPEC QL NAA+PROBE: NOT DETECTED

## 2020-12-30 ENCOUNTER — HOSPITAL ENCOUNTER (INPATIENT)
Facility: HOSPITAL | Age: 85
LOS: 1 days | DRG: 084 | End: 2020-12-31
Attending: SURGERY | Admitting: SURGERY
Payer: MEDICARE

## 2020-12-30 ENCOUNTER — APPOINTMENT (EMERGENCY)
Dept: RADIOLOGY | Facility: HOSPITAL | Age: 85
DRG: 084 | End: 2020-12-30
Payer: MEDICARE

## 2020-12-30 VITALS
TEMPERATURE: 95.6 F | SYSTOLIC BLOOD PRESSURE: 105 MMHG | BODY MASS INDEX: 30.92 KG/M2 | DIASTOLIC BLOOD PRESSURE: 50 MMHG | HEIGHT: 57 IN | HEART RATE: 92 BPM | OXYGEN SATURATION: 96 % | WEIGHT: 143.3 LBS | RESPIRATION RATE: 22 BRPM

## 2020-12-30 DIAGNOSIS — Z51.5 END OF LIFE CARE: ICD-10-CM
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
## 2020-12-30 PROBLEM — S06.360A INTRAPARENCHYMAL HEMATOMA OF BRAIN (HCC): Status: ACTIVE | Noted: 2020-12-30

## 2020-12-30 LAB
ANION GAP SERPL CALCULATED.3IONS-SCNC: 5 MMOL/L (ref 4–13)
BASE EXCESS BLDA CALC-SCNC: 2 MMOL/L (ref -2–3)
BASOPHILS # BLD AUTO: 0.05 THOUSANDS/ΜL (ref 0–0.1)
BASOPHILS NFR BLD AUTO: 1 % (ref 0–1)
BUN SERPL-MCNC: 25 MG/DL (ref 5–25)
CALCIUM SERPL-MCNC: 9.2 MG/DL (ref 8.3–10.1)
CHLORIDE SERPL-SCNC: 108 MMOL/L (ref 100–108)
CO2 SERPL-SCNC: 28 MMOL/L (ref 21–32)
CREAT SERPL-MCNC: 1.1 MG/DL (ref 0.6–1.3)
EOSINOPHIL # BLD AUTO: 0.4 THOUSAND/ΜL (ref 0–0.61)
EOSINOPHIL NFR BLD AUTO: 7 % (ref 0–6)
ERYTHROCYTE [DISTWIDTH] IN BLOOD BY AUTOMATED COUNT: 12.7 % (ref 11.6–15.1)
GFR SERPL CREATININE-BSD FRML MDRD: 43 ML/MIN/1.73SQ M
GLUCOSE SERPL-MCNC: 123 MG/DL (ref 65–140)
GLUCOSE SERPL-MCNC: 128 MG/DL (ref 65–140)
HCO3 BLDA-SCNC: 26.5 MMOL/L (ref 24–30)
HCT VFR BLD AUTO: 34.8 % (ref 34.8–46.1)
HCT VFR BLD CALC: 34 % (ref 34.8–46.1)
HGB BLD-MCNC: 11.6 G/DL (ref 11.5–15.4)
HGB BLDA-MCNC: 11.6 G/DL (ref 11.5–15.4)
IMM GRANULOCYTES # BLD AUTO: 0.02 THOUSAND/UL (ref 0–0.2)
IMM GRANULOCYTES NFR BLD AUTO: 0 % (ref 0–2)
INR PPP: 0.96 (ref 0.84–1.19)
LYMPHOCYTES # BLD AUTO: 2.05 THOUSANDS/ΜL (ref 0.6–4.47)
LYMPHOCYTES NFR BLD AUTO: 35 % (ref 14–44)
MCH RBC QN AUTO: 29.4 PG (ref 26.8–34.3)
MCHC RBC AUTO-ENTMCNC: 33.3 G/DL (ref 31.4–37.4)
MCV RBC AUTO: 88 FL (ref 82–98)
MONOCYTES # BLD AUTO: 0.38 THOUSAND/ΜL (ref 0.17–1.22)
MONOCYTES NFR BLD AUTO: 7 % (ref 4–12)
NEUTROPHILS # BLD AUTO: 2.93 THOUSANDS/ΜL (ref 1.85–7.62)
NEUTS SEG NFR BLD AUTO: 50 % (ref 43–75)
NRBC BLD AUTO-RTO: 0 /100 WBCS
PCO2 BLD: 28 MMOL/L (ref 21–32)
PCO2 BLD: 42 MM HG (ref 42–50)
PH BLD: 7.41 [PH] (ref 7.3–7.4)
PLATELET # BLD AUTO: 156 THOUSANDS/UL (ref 149–390)
PMV BLD AUTO: 11.2 FL (ref 8.9–12.7)
PO2 BLD: 39 MM HG (ref 35–45)
POTASSIUM BLD-SCNC: 4.1 MMOL/L (ref 3.5–5.3)
POTASSIUM SERPL-SCNC: 4.2 MMOL/L (ref 3.5–5.3)
PROTHROMBIN TIME: 12.8 SECONDS (ref 11.6–14.5)
RBC # BLD AUTO: 3.95 MILLION/UL (ref 3.81–5.12)
SAO2 % BLD FROM PO2: 73 % (ref 60–85)
SODIUM BLD-SCNC: 140 MMOL/L (ref 136–145)
SODIUM SERPL-SCNC: 141 MMOL/L (ref 136–145)
SPECIMEN SOURCE: ABNORMAL
WBC # BLD AUTO: 5.83 THOUSAND/UL (ref 4.31–10.16)

## 2020-12-30 PROCEDURE — NC001 PR NO CHARGE: Performed by: EMERGENCY MEDICINE

## 2020-12-30 PROCEDURE — 93308 TTE F-UP OR LMTD: CPT | Performed by: SURGERY

## 2020-12-30 PROCEDURE — 99223 1ST HOSP IP/OBS HIGH 75: CPT | Performed by: NEUROLOGICAL SURGERY

## 2020-12-30 PROCEDURE — 80048 BASIC METABOLIC PNL TOTAL CA: CPT | Performed by: NURSE PRACTITIONER

## 2020-12-30 PROCEDURE — 84132 ASSAY OF SERUM POTASSIUM: CPT

## 2020-12-30 PROCEDURE — 82947 ASSAY GLUCOSE BLOOD QUANT: CPT

## 2020-12-30 PROCEDURE — 99285 EMERGENCY DEPT VISIT HI MDM: CPT

## 2020-12-30 PROCEDURE — 76705 ECHO EXAM OF ABDOMEN: CPT | Performed by: SURGERY

## 2020-12-30 PROCEDURE — 85014 HEMATOCRIT: CPT

## 2020-12-30 PROCEDURE — 70450 CT HEAD/BRAIN W/O DYE: CPT

## 2020-12-30 PROCEDURE — 82803 BLOOD GASES ANY COMBINATION: CPT

## 2020-12-30 PROCEDURE — 85610 PROTHROMBIN TIME: CPT | Performed by: NURSE PRACTITIONER

## 2020-12-30 PROCEDURE — 99223 1ST HOSP IP/OBS HIGH 75: CPT | Performed by: SURGERY

## 2020-12-30 PROCEDURE — 72125 CT NECK SPINE W/O DYE: CPT

## 2020-12-30 PROCEDURE — 84295 ASSAY OF SERUM SODIUM: CPT

## 2020-12-30 PROCEDURE — 36415 COLL VENOUS BLD VENIPUNCTURE: CPT | Performed by: NURSE PRACTITIONER

## 2020-12-30 PROCEDURE — 85025 COMPLETE CBC W/AUTO DIFF WBC: CPT | Performed by: NURSE PRACTITIONER

## 2020-12-30 PROCEDURE — 71260 CT THORAX DX C+: CPT

## 2020-12-30 PROCEDURE — 74177 CT ABD & PELVIS W/CONTRAST: CPT

## 2020-12-30 RX ORDER — MULTIVIT WITH MINERALS/LUTEIN
1000 TABLET ORAL DAILY
COMMUNITY
End: 2020-12-31 | Stop reason: HOSPADM

## 2020-12-30 RX ORDER — CHLORAL HYDRATE 500 MG
2000 CAPSULE ORAL DAILY
COMMUNITY
End: 2020-12-31 | Stop reason: HOSPADM

## 2020-12-30 RX ORDER — HALOPERIDOL 5 MG/ML
0.5 INJECTION INTRAMUSCULAR
Status: DISCONTINUED | OUTPATIENT
Start: 2020-12-30 | End: 2020-12-31 | Stop reason: HOSPADM

## 2020-12-30 RX ORDER — LORAZEPAM 2 MG/ML
0.5 INJECTION INTRAMUSCULAR
Status: DISCONTINUED | OUTPATIENT
Start: 2020-12-30 | End: 2020-12-31 | Stop reason: HOSPADM

## 2020-12-30 RX ORDER — TRAMADOL HYDROCHLORIDE 50 MG/1
50 TABLET ORAL DAILY
COMMUNITY
End: 2020-12-31 | Stop reason: HOSPADM

## 2020-12-30 RX ORDER — AMLODIPINE BESYLATE 2.5 MG/1
2.5 TABLET ORAL DAILY
COMMUNITY
End: 2020-12-31 | Stop reason: HOSPADM

## 2020-12-30 RX ORDER — GLYCOPYRROLATE 0.2 MG/ML
0.2 INJECTION INTRAMUSCULAR; INTRAVENOUS
Status: DISCONTINUED | OUTPATIENT
Start: 2020-12-30 | End: 2020-12-31 | Stop reason: HOSPADM

## 2020-12-30 RX ORDER — CHLORHEXIDINE GLUCONATE 0.12 MG/ML
15 RINSE ORAL EVERY 12 HOURS SCHEDULED
Status: DISCONTINUED | OUTPATIENT
Start: 2020-12-30 | End: 2020-12-30

## 2020-12-30 RX ORDER — HYDROCHLOROTHIAZIDE 12.5 MG/1
12.5 TABLET ORAL DAILY
COMMUNITY
End: 2020-12-31 | Stop reason: HOSPADM

## 2020-12-30 RX ORDER — CHOLECALCIFEROL (VITAMIN D3) 50 MCG
2000 TABLET ORAL DAILY
COMMUNITY
End: 2020-12-31 | Stop reason: HOSPADM

## 2020-12-30 RX ORDER — LISINOPRIL 40 MG/1
20 TABLET ORAL DAILY
COMMUNITY
End: 2020-12-31 | Stop reason: HOSPADM

## 2020-12-30 RX ORDER — MULTIVITAMIN
1 TABLET ORAL DAILY
COMMUNITY
End: 2020-12-31 | Stop reason: HOSPADM

## 2020-12-30 RX ADMIN — LEVETIRACETAM 500 MG: 100 INJECTION, SOLUTION INTRAVENOUS at 12:50

## 2020-12-30 RX ADMIN — GLYCOPYRROLATE 0.2 MG: 0.2 INJECTION, SOLUTION INTRAMUSCULAR; INTRAVENOUS at 23:07

## 2020-12-30 RX ADMIN — IOHEXOL 100 ML: 350 INJECTION, SOLUTION INTRAVENOUS at 11:04

## 2020-12-30 RX ADMIN — LEVETIRACETAM 500 MG: 100 INJECTION, SOLUTION INTRAVENOUS at 12:17

## 2020-12-30 NOTE — CASE MANAGEMENT
Cm spoke to pt's dtr [place]  CM discussed pt's comfort care status and the option of IP Hospice  Pt's family is agreeable to [id number]  and would like a referral placed   CM reached out to [facility] and will follow up with family

## 2020-12-30 NOTE — HOSPICE NOTE
Hospice referral received  Will assess patient in AM for inpatient hospice  Liaison called daughter, [place] and she is aware pt will be assessed in AM and liaison will call her

## 2020-12-30 NOTE — H&P
H&P Exam - Trauma   Leandro Burris 80 y o  female MRN: [id number]  Unit/Bed#: TR 02 Encounter: [id number]    Assessment/Plan   Trauma Alert: Level B  Model of Arrival: Ambulance  Trauma Team: Attending Franklin Blood and AP Marylou Hoskins  Consultants: Neurosurgery: [place]  Time Called 1109    Trauma Active Problems:   · Fall  · Bifrontal intraparenchymal hemorrhage with associated extra-axial, primarily subarachnoid hemorrhages  · Altered mental status  Trauma Plan:   · Neurosurgical consultation  · ICU admission    Chief Complaint: Fall    History of Present Illness   HPI:  Leandro Burris is a 80 y o  female who presents via EMS for assessment following a fall from height  EMS reports that patient was walking up concrete steps and was approxamately 5 steps up when she fell off the side of the steps  It is unknown if patient hit her head or lost of consciousness  No reported signs of trauma  At the scene, EMS stated that family thought patient was more confused than usual   Family stated that she does have a history mild dementia, but typically is generally oriented  On arrival, patient offers no complaint  Review of systems is unable to be completed due to patient's current mental status  Mechanism:Fall    Review of Systems    12-point, complete review of systems was reviewed and negative except as stated above  Historical Information   History is unobtainable from the patient due to [facility]  Efforts to obtain history included the following sources: family member, other medical personnel, obtained from other records    No past medical history on file  No past surgical history on file  Social History   Social History     Substance and Sexual Activity   Alcohol Use Not on file     Social History     Substance and Sexual Activity   Drug Use Not on file     Social History     Tobacco Use   Smoking Status Not on file     No existing history information found  No existing history information found      There is no immunization history on file for this patient  Last Tetanus:  Unknown  Not indicated at this time  Family History: Non-contributory      Meds/Allergies   all current active meds have been reviewed    Not on File      PHYSICAL EXAM    Objective   Vitals:   First set: Temperature: (!) 96 5 °F (35 8 °C) (12/30/20 1052)  Pulse: 102 (12/30/20 1048)  Respirations: 20 (12/30/20 1048)  Blood Pressure: 147/85 (12/30/20 1048)    Primary Survey:   (A) Airway:  Patent  (B) Breathing:  Clear to auscultation, bilaterally  (C) Circulation: Pulses:   normal, pedal  2/4 and radial  2/4  (D) Disabliity:  GCS Total:  15  (E) Expose:  Completed    Secondary Survey: (Click on Physical Exam tab above)  Physical Exam  Constitutional:       General: She is not in acute distress  Appearance: She is not diaphoretic  HENT:      Head: Normocephalic  Nose: Nose normal       Mouth/Throat:      Mouth: Mucous membranes are moist       Pharynx: Oropharynx is clear  Eyes:      Pupils: Pupils are equal, round, and reactive to light  Neck:      Comments: C-collar in place  Cardiovascular:      Rate and Rhythm: Normal rate and regular rhythm  Pulses: Normal pulses  Radial pulses are 2+ on the right side and 2+ on the left side  Femoral pulses are 2+ on the right side and 2+ on the left side  Dorsalis pedis pulses are 2+ on the right side and 2+ on the left side  Heart sounds: Normal heart sounds  Pulmonary:      Effort: Pulmonary effort is normal       Breath sounds: Normal breath sounds  No decreased breath sounds, wheezing, rhonchi or rales  Chest:      Chest wall: No tenderness  Abdominal:      Palpations: Abdomen is soft  Tenderness: There is no abdominal tenderness  Musculoskeletal:      Comments: No deformities noted  Spinal column had palpable step-offs or signs of trauma  Skin:     General: Skin is warm and dry  Capillary Refill: Capillary refill takes less than 2 seconds  Neurological:      Mental Status: She is alert  GCS: GCS eye subscore is 4  GCS verbal subscore is 4  GCS motor subscore is 5  Comments: Patient will not follow commands on initial examination but withdrawals in all extremities to pain  Patient will not answer questions but will repeat questions back to provider with clear speech  No focal weakness/deficits noted  Psychiatric:         Speech: Speech normal          Behavior: Behavior is cooperative  Invasive Devices     Peripheral Intravenous Line            Peripheral IV 12/30/20 Left Antecubital less than 1 day    Peripheral IV 12/30/20 Left Hand less than 1 day                Lab Results:   Results: I have personally reviewed pertinent reports   , BMP/CMP:   Lab Results   Component Value Date    SODIUM 141 12/30/2020    K 4 2 12/30/2020     12/30/2020    CO2 28 12/30/2020    BUN 25 12/30/2020    CREATININE 1 10 12/30/2020    GLUCOSE 128 12/30/2020    CALCIUM 9 2 12/30/2020    EGFR 43 12/30/2020   , CBC:   Lab Results   Component Value Date    WBC 5 83 12/30/2020    HGB 11 6 12/30/2020    HCT 34 (L) 12/30/2020    MCV 88 12/30/2020     12/30/2020    MCH 29 4 12/30/2020    MCHC 33 3 12/30/2020    RDW 12 7 12/30/2020    MPV 11 2 12/30/2020    NRBC 0 12/30/2020    and Coagulation:   Lab Results   Component Value Date    INR 0 96 12/30/2020     Imaging/EKG Studies: Results: I have personally reviewed pertinent reports  , FAST: Fast is negative  CT head:  Bifrontal interrupt parenchymal hemorrhage with associated extra-axial, primarily subarachnoid hemorrhage  Mild mass effect without midline shift   Scattered areas of subarachnoid hemorrhage present bilaterally  CT neck: Negative  Other Studies:  None    Code Status: No Order  Advance Directive and Living Will:      Power of :    POLST:

## 2020-12-30 NOTE — PROCEDURES
POC FAST US    Date/Time: 12/30/2020 11:49 AM  Performed by: DAMION Morrell  Authorized by: Raúl Morrell     Patient location:  Trauma  Procedure details:     Exam Type:  Diagnostic    Indications: blunt abdominal trauma      Assess for:  Hemothorax, intra-abdominal fluid, pericardial effusion and pneumothorax    Technique: FAST      Views obtained:  Heart - Pericardial sac, RUQ - Mayen's Pouch, LUQ - Splenorenal space and Suprapubic - Pouch of Godwin    Image availability:  Video obtained  FAST Findings:     RUQ (Hepatorenal) free fluid: absent      LUQ (Splenorenal) free fluid: absent      Suprapubic free fluid: absent      Cardiac wall motion: identified      Pericardial effusion: absent    Interpretation:     Impressions: negative

## 2020-12-30 NOTE — ASSESSMENT & PLAN NOTE
S/p fall up the stairs with +head trauma but no LOC    Imaging reviewed personally and with attending, results are as follows:   CT cervical spine wo contrast 12/30/2020:  No cervical spine fracture or traumatic malalignment     CT chest abdomen pelvis with contrast 12/30/2020:  No acute traumatic findings in the chest, abdomen or pelvis    Plan:  · See plan above

## 2020-12-30 NOTE — ED PROVIDER NOTES
Emergency Department Airway Evaluation and Management Form    History  Obtained from:  EMS Review of patient's allergies indicates no known allergies  EMS    Chief Complaint:  Trauma Alert    HPI: Pt is a 80 y o  female presents s/p  Fall off approximately 5 steps onto  the ground that was gravel apparently found members helped her get up into a chair in the house and they called EMS patient is not verbal and is not expressing any complaints at the present time  according to EMS she was perseverating        I have reviewed and agree with the history as documented  Physical Exam    Vitals:    12/30/20 1055   BP: (!) 182/81   Pulse: 99   Resp: 20   Temp:    SpO2: 99%     Supplemental Oxygen yes    GCS:  13    Neuro: Alert and oriented  Psych: not combative, not anxious, cooperative for exam  Neck: In collar, No JVD, No midline tenderness  Cardio:  Normal  Respiratory: Normal  Mouth:  Normal  Pharynx: Normal    Monitor:  NSR      ED Medications    No current facility-administered medications for this encounter  No current outpatient medications on file        Intubation    No intubation required    Final Diagnosis:  Fall with change of mental status  ED Provider  Electronically Signed by       Mike Morris MD  12/30/20 1106

## 2020-12-30 NOTE — ASSESSMENT & PLAN NOTE
S/p fall up the stairs, no blood thinner agents, no LOC but +head trauma, found to have large bifrontal IPH    Imaging reviewed personally and with attending, results are as follows:  · CT head wo contrast 12/30/2020: Moderately extensive acute bifrontal intraparenchymal hemorrhage with associated extra-axial, primarily subarachnoid hemorrhage as noted  There is mild associated mass effect without midline shift or herniation noted  Scattered areas of subarachnoid hemorrhage present bilaterally  While this hemorrhage is most likely related to the patient's trauma that preceded this study, likely in part contrecoup injury (midline posterior parieto-occipital scalp hematoma noted), the possibility of an area of subacute hemorrhage in the right frontal lobe with superimposed more acute hemorrhage in both frontal lobes is not entirely excludable  Serial follow-up imaging advised  Subtotal opacification left mastoid air cells  No definite fracture identified  Plan:   Continue to monitor neurological exam, currently patient is a GCS 10 (E4, V2, M4), does not follow commands but spont moving all extremities more so RUE, fixed right gaze but pupils 2mm and reactive bilaterally, moaning, may be post ictal   Repeat CT head in 6 hours for close serial monitoring or STAT if GCS declines more than 2 points in 1 hour   SBP < 160   Hold all AC / AP therapy   keppra 500mg x 1 week for seizure ppx   Medical management and pain control per primary team  o Discussed with attending and primary team, no need for hypertonic saline at this time   DVT ppx:  SCDs, hold pharm dvt ppx until stable scan is obtained  [employer] as tolerated with assistance, PT / OT evaluation   Attending present with family and patient, ongoing [address]     Neurosurgery will continue to follow  Please call with questions or concerns

## 2020-12-30 NOTE — PROGRESS NOTES
Family at bedside with pt  Dr Laura Sneed at bedside with family and pt  Status and plan of care and code status discussed with family  Ct scans reviewed with family in pt  Room  5 wishes document brought with pt  And pt transitioned to comfort care level 4   Comfort cart  At bedside with family

## 2020-12-30 NOTE — TREATMENT PLAN
Treatment plan    2020     Javier Barillas    Age 89 yrs     1925    [facility]  [facility]    Seen in ER    Please also see detaiedl Consult    We are asked to see this 27-year-old woman after a fall in her driveway after severe TBI with early large Bifrontal intraparenchymal hemorrhage contusions with early    This 27-year-old woman previously fairly independent and using a cane for walking ,living with her daughter , has sustained sustained severe traumatic brain injury TBI status post fall coming out of the house down stairs on her way to a podiatry appointment  PMH:  Hypertension, OA and bilateral TKR    Head CT scan shows large bifrontal intracranial hemorrhage with bifrontal contusions with early surrounding edema right more than left    There is age expected fairly marked cerebral atrophy with wide sulci and very generous ventricles    · Imaging reviewed by me  · CT head wo contrast 2020:    · Large extensive acute bifrontal intraparenchymal hemorrhage with associated extra-axial, primarily subarachnoid hemorrhage as noted occupying anterolateral and bifrontal sulci  · The right-sided large frontal intraparenchymal hemorrhage measures 6 5 x 2 3 x 2 2 cm   ·  There is mild associated mass effect without midline shift or herniation noted  · The left-sided large frontal intraparenchymal hemorrhage measures  5 2 x 3 2 x 2 7 cm extends up to the cortical surface  ·  Scattered patchy areas of subarachnoid hemorrhage present bilaterally through all parts of convexity frontal temporal and parietal    · Bifrontal hemorrhages root likely related to contrecoup mechanism after fall on right occipital  · Low-density changes around the right frontal hemorrhage extensive  · Possible previous area of involuting subacute hemorrhage might have been procedure give this  appearance at this stage    · Ventricles remain open  · Mesencephalic cistern is open   ·  Subtotal opacification left mastoid air cells  · No definite calvarium fracture identified  · CT cervical spine without contrast 12/30/2020 shows  · Some straightening of cervical lordosis  · Extensive age-related degenerative change  · Obvious fracture subluxation  · CT chest abdomen pelvis 12/30/2020 showed  · No acute traumatic findings in the chest abnormal pelvis  · Multiple degenerative changes noted in the spine including grade 1 anterolisthesis of L4 and L5    Exam: GCS 10 (E4, V2, M4), does not follow commands  Some noises and sounds at time  Opened eyes and has persistent right lateral gaze  Pupils small 2 mm reactive  She has had cataract surgery bilateral  Will not follow and track across midline  No tonic-clonic activity  But hypertonic all 4 extremities  Tends to keep arms across the chest abducted pronated in fisted bilaterally  Hands can be lifted up bilaterally passively  She tends to resist     She will hold them up briefly and then lower the right more than left   Flexes lower extremities  Previous incisions from bilateral TKR    Plan:  · Continue to monitor neurological exam,   · Repeat CT head in 6 hours for close serial monitoring or STAT if GCS declines more than 2 points in 1 hour  · SBP < 160  · Hold all AC / AP therapy  · keppra 500mg x 1 week for seizure ppx  · Medical management and pain control per primary team  ? Discussed with Dr Tejinder Pearson  and trauma team  no need for hypertonic saline at this time  ? Has extensive age-related atrophy  ? No ventricular compression or mid brain compression  ? Edema around hemorrhages likely to progress  ? Daughter aware condition likely to worsen  ? Likely mute at this time from increasing brain swelling around left frontal hematoma affecting Broca's area left frontal  ? No operative neurosurgical intervention indicated  ?  Prognosis for independent and or even semi independent recovery recovery talking and aware poor  · DVT ppx:  SCDs, hold pharm dvt ppx until stable scan is obtained  · Mobilize as tolerated with assistance, PT / OT evaluation  · Could consider early repeat CT scan in 6 hrs  Likely to show progression in edema around intraparenchymal hemorrhage and/or emergence and/or development of other small areas of intraparenchymal contusion  · Recommend further full and shashi discussion with her daughter [place] about goals of care  · Paola for independent recovery even with maximal interventions very poor    I had full and shashi discussion about presentation and examination and head CT  findings with her daughter [place]    No indication for neurosurgical intervention    Prognosis for independent survival and recovery very low    I discussed my impression ongoing management recommendations with Dr Marisa Nelson  and the trauma team    12/30/2020 11:38 AM    Reece Arcos MD, Attending Neurological Surgeon

## 2020-12-30 NOTE — CONSULTS
Addendum  Patient has been made comfort care at this time  No additional recommendations from our service at this time  Outpatient follow up on an as needed basis  Signed off, please call with questions or concerns  EbenezerSushil Tony 5/30/1925, 80 y o  female MRN: [id number]    Unit/Bed#: ED 19 Encounter: [id number]    Primary Care Provider: Ankita Patel MD   Date and time admitted to hospital: 12/30/2020 10:44 AM      Inpatient consult to Neurosurgery  Consult performed by: Bethel Sadler PA-C  Consult ordered by: DAMION Romero      consult completed on 12/30/2020 at 1120    Intraparenchymal hematoma of brain [facility])  Assessment & Plan  S/p fall up the stairs, no blood thinner agents, no LOC but +head trauma, found to have large bifrontal IPH    Imaging reviewed personally and with attending, results are as follows:  · CT head wo contrast 12/30/2020: Moderately extensive acute bifrontal intraparenchymal hemorrhage with associated extra-axial, primarily subarachnoid hemorrhage as noted  There is mild associated mass effect without midline shift or herniation noted  Scattered areas of subarachnoid hemorrhage present bilaterally  While this hemorrhage is most likely related to the patient's trauma that preceded this study, likely in part contrecoup injury (midline posterior parieto-occipital scalp hematoma noted), the possibility of an area of subacute hemorrhage in the right frontal lobe with superimposed more acute hemorrhage in both frontal lobes is not entirely excludable  Serial follow-up imaging advised  Subtotal opacification left mastoid air cells  No definite fracture identified      Plan:   Continue to monitor neurological exam, currently patient is a GCS 10 (E4, V2, M4), does not follow commands but spont moving all extremities more so RUE, fixed right gaze but pupils 2mm and reactive bilaterally, moaning, may be post ictal   Repeat CT head in 6 hours for close serial monitoring or STAT if GCS declines more than 2 points in 1 hour   SBP < 160   Hold all AC / AP therapy   keppra 500mg x 1 week for seizure ppx   Medical management and pain control per primary team  o Discussed with attending and primary team, no need for hypertonic saline at this time   DVT ppx:  SCDs, hold pharm dvt ppx until stable scan is obtained  [employer] as tolerated with assistance, PT / OT evaluation   Attending present with family and patient, ongoing [address]     Neurosurgery will continue to follow  Please call with questions or concerns  Fall  Assessment & Plan  S/p fall up the stairs with +head trauma but no LOC    Imaging reviewed personally and with attending, results are as follows:   CT cervical spine wo contrast 12/30/2020:  No cervical spine fracture or traumatic malalignment   CT chest abdomen pelvis with contrast 12/30/2020:  No acute traumatic findings in the chest, abdomen or pelvis    Plan:  · See plan above      History of Present Illness   HPI: Arsh Gupta is a 80y o  year old female who presents s/p fall up the stairs earlier today with large IPH noted on CT head  Patient is currently nonverbal and therefore history is gathered from medical records and daughter who is present in the ED   daughter was walking behind patient while walking up the stairs when all of a sudden she lost her balance and fell up the stairs  No loss of consciousness  At the time EMS arrived patient was not verbal and not expressing any complaints  Per daughter no AC / AP therapy taken at home  She lives in the unit above her daughter normally but has been recently staying with her so she is not alone  She has some baseline dementia but is able to speak and do some ADLs  She ambulates with a cane when out of the house  Review of Systems   Unable to perform ROS: Patient nonverbal       Historical Information   No past medical history on file  No past surgical history on file    Social History     Substance and Sexual Activity   Alcohol Use Not on file     Social History     Substance and Sexual Activity   Drug Use Not on file     Social History     Tobacco Use   Smoking Status Not on file     No family history on file  Meds/Allergies   all current active meds have been reviewed, current meds:   Current Facility-Administered Medications   Medication Dose Route Frequency    chlorhexidine (PERIDEX) 0 12 % oral rinse 15 mL  15 mL Swish & Spit Q12H [facility]    levETIRAcetam (KEPPRA) 500 mg in sodium chloride 0 9 % 100 mL IVPB  500 mg Intravenous Q12H [facility]    and PTA meds:   None     Not on File    Objective   I/O     None          Physical Exam  Constitutional:       General: She is awake  Interventions: Cervical collar in place  HENT:      Head: Normocephalic and atraumatic  Eyes:      Extraocular Movements:      Right eye: Abnormal extraocular motion present  Left eye: Abnormal extraocular motion present  Comments: Right gaze preference, unable to track finger   Neck:      Comments: Collar in place  Cardiovascular:      Rate and Rhythm: Tachycardia present  Pulmonary:      Effort: Pulmonary effort is normal       Breath sounds: Normal breath sounds  Neurological:      Mental Status: She is alert  Psychiatric:         Speech: She is noncommunicative  Comments: Nonverbal       Neurologic Exam     Mental Status   GCS 10 (E4, V2, M4)  Eyes open, right fixed gaze, does not track, pupils 2mm and reactive bilaterally  Nonverbal for the most part, only moans once with painful stimuli  Moving spont x 4, more so in the RUE but otherwise not following any commands     Cranial Nerves     CN III, IV, VI   Right pupil: Size: 2 mm  Shape: regular  Left pupil: Size: 2 mm  Shape: regular       Motor Exam   No following commands  If arms are lifted in the air she is able to hold against gravity briefly then gently lowers to the bed   Otherwise moving spont x 4, more so in the RUE Sensory Exam   Intact to crude touch     Gait, Coordination, and Reflexes     Reflexes   Right Cuevas: absent  Left Cuevas: absent  Right ankle clonus: absent  Left ankle clonus: absent      Vitals:Blood pressure 149/59, pulse (!) 106, temperature (!) 96 5 °F (35 8 °C), temperature source Tympanic, resp  rate 18, weight 63 5 kg (139 lb 15 9 oz), SpO2 96 %  ,There is no height or weight on file to calculate BMI  Lab Results:   Results from last 7 days   Lab Units 12/30/20  1059 12/30/20  1056   WBC Thousand/uL  --  5 83   HEMOGLOBIN g/dL  --  11 6   I STAT HEMOGLOBIN g/dl 11 6  --    HEMATOCRIT %  --  34 8   HEMATOCRIT, ISTAT % 34*  --    PLATELETS Thousands/uL  --  156   NEUTROS PCT %  --  50   MONOS PCT %  --  7     Results from last 7 days   Lab Units 12/30/20  1059 12/30/20  1056   POTASSIUM mmol/L  --  4 2   CHLORIDE mmol/L  --  108   CO2 mmol/L  --  28   CO2, I-STAT mmol/L 28  --    BUN mg/dL  --  25   CREATININE mg/dL  --  1 10   CALCIUM mg/dL  --  9 2   GLUCOSE, ISTAT mg/dl 128  --              Results from last 7 days   Lab Units 12/30/20  1056   INR  0 96       Imaging Studies: I have personally reviewed pertinent reports  and I have personally reviewed pertinent films in PACS    Trauma - Ct Chest Abdomen Pelvis W Contrast    Result Date: 12/30/2020  Impression: No acute traumatic findings in the chest, abdomen or pelvis  Chronic/incidental findings as described  I personally discussed this study with [address] on 12/30/2020 at 11:31 AM  Workstation performed: [id number]     EKG, Pathology, and Other Studies: I have personally reviewed pertinent reports  VTE Prophylaxis: Sequential compression device (Venodyne)  and Reason for no pharmacologic prophylaxis - [facility]    Code Status: Level 1 - Full Code  Advance Directive and Living Will:      Power of :    POLST:      Counseling / Coordination of Care  I spent 20 minutes with the patient

## 2020-12-30 NOTE — PROGRESS NOTES
59-year-old female with a past medical history of CKD was admitted to our surgical ICU s/p fall down several steps, suffering severe TBI, with extensive frontal intraparenchymal hemorrhage  On arrival to the ICU the patient's GCS had a decline from admission score of 10 to 8  While requiring intubation from a mental status standpoint, the patient was saturating adequately, and so supplemental oxygen was provided while the patient's daughter was contacted  I called and spoke with the patient's daughter, Antonieta Walters, [phone], with whom the patient lives and discussed the patient's clinical status  We discussed the patient's 5 wishes document which was presented on the patient's admission, which designated her healthcare providers as healthcare proxies in the event that she had an incapacitating illness  The 5 wishes document states that the patient wished to be be provided treatments to sustain life only if her illness were recoverable  The documented did not make mention of her desire for Life Life sustaining Treatment if quality of life would be poor  It was explained to the patient's daughter that the patient has an injury which will most likely be fatal, however with life-sustaining treatments, the patient may survive, but would have an extremely poor quality of life  In consultation with our neurosurgical colleagues it was agreed that if the patient were to survive any life-sustaining treatment she would never be the same, would never be independent and would require skilled nursing care indefinitely  Given the patient's age and 5 wishes document, it is felt that the patient would not wish to have life-sustaining treatment for such a poor outcome  This was all discussed with the patient's daughter in depth who agreed the best course of action is to pursue comfort measures  Dr Nae Roldan of Neurosurgery, and Dr Radha Curry of SICU were involved with and agreeing with this plan of care      N  Alin Tatum  Fellow  Surgical Critical Care

## 2020-12-31 PROCEDURE — 99238 HOSP IP/OBS DSCHRG MGMT 30/<: CPT | Performed by: EMERGENCY MEDICINE

## 2020-12-31 PROCEDURE — NC001 PR NO CHARGE: Performed by: EMERGENCY MEDICINE

## 2020-12-31 RX ORDER — HALOPERIDOL 5 MG/ML
0.5 INJECTION INTRAMUSCULAR
Qty: 1 ML | Refills: 0
Start: 2020-12-31

## 2020-12-31 RX ORDER — GLYCOPYRROLATE 0.2 MG/ML
0.2 INJECTION INTRAMUSCULAR; INTRAVENOUS
Qty: 1 ML | Refills: 0
Start: 2020-12-31

## 2020-12-31 RX ORDER — LORAZEPAM 2 MG/ML
0.5 INJECTION INTRAMUSCULAR
Refills: 0
Start: 2020-12-31 | End: 2021-01-10

## 2020-12-31 RX ADMIN — MORPHINE SULFATE 2 MG: 2 INJECTION, SOLUTION INTRAMUSCULAR; INTRAVENOUS at 14:06

## 2020-12-31 NOTE — TRANSPORTATION MEDICAL NECESSITY
Section I - General Information    Name of Patient: Brian Poon                 : 1925    Medicare #: [id number]  Transport Date: 20 (PCS is valid for round trips on this date and for all repetitive trips in the 60-day range as noted below )  [facility]  Is the pt's stay covered under Medicare Part A (PPS/DRG)   [x]     Closest appropriate facility? If no, why is transport to more distant facility required? Yes  If hospice pt, is this transport related to pt's terminal illness? Yes       Section II - Medical Necessity Questionnaire  Ambulance transportation is medically necessary only if other means of transport are contraindicated or would be potentially harmful to the patient  To meet this requirement, the patient must either be "bed confined" or suffer from a condition such that transport by means other than ambulance is contraindicated by the patient's condition  The following questions must be answered by the medical professional signing below for this form to be valid:    1)  Describe the MEDICAL CONDITION (physical and/or mental) of this patient AT [address] [place] [address] that requires the patient to be transported in an ambulance and why transport by other means is contraindicated by the patient's condition: IPH, fall, hospice    2) Is the patient "bed confined" as defined below? Yes  To be "be confined" the patient must satisfy all three of the following conditions: (1) unable to get up from bed without Assistance; AND (2) unable to ambulate; AND (3) unable to sit in a chair or wheelchair  3) Can this patient safely be transported by car or wheelchair van (i e , seated during transport without a medical attendant or monitoring)?    No    4) In addition to completing questions 1-3 above, please check any of the following conditions that apply*: *Note: supporting documentation for any boxes checked must be maintained in the patient's medical records  If hosp-hosp transfer, describe services needed at 2nd facility not available at 1st facility? Patient is confused  Patient is comatose  Moderate/severe pain on movement   Unable to tolerate seated position for time needed to transport   Other(specify) Hospice      Section III - Signature of Physician or Healthcare Professional  I certify that the above information is true and correct based on my evaluation of this patient, and represent that the patient requires transport by ambulance and that other forms of transport are contraindicated  I understand that this information will be used by the Centers for Medicare and Medicaid Services (CMS) to support the determination of medical necessity for ambulance services, and I represent that I have personal knowledge of the patient's condition at time of transport  []  If this box is checked, I also certify that the patient is physically or mentally incapable of signing the ambulance service's claim and that the institution with which I am affiliated has furnished care, services, or assistance to the patient  My signature below is made on behalf of the patient pursuant to 42 CFR §424 36(b)(4)  In accordance with 42 CFR §424 37, the specific reason(s) that the patient is physically or mentally incapable of signing the claim form is as follows:       Signature of Physician* or Healthcare Professional______________________________________________________________  Signature Date 12/31/20 (For scheduled repetitive transports, this form is not valid for transports performed more than 60 days after this date)    Printed Name & Credentials of Physician or Healthcare Professional (MD, DO, RN, etc )___Yoni BLACKWELL_____________________________  *Form must be signed by patient's attending physician for scheduled, repetitive transports   For non-repetitive, unscheduled ambulance transports, if unable to obtain the signature of the attending physician, any of the following may sign (choose appropriate option below)  [] Physician Assistant []  Clinical Nurse Specialist []  Registered Nurse  []  Nurse Practitioner  [x] Discharge Planner

## 2020-12-31 NOTE — DISCHARGE SUMMARY
Discharge Summary - Merari Thompson 80 y o  female MRN: [id number]    Unit/Bed#: ICU 09 Encounter: [id number]    Admission Date:   Admission Orders (From admission, onward)     Ordered        12/30/20 1121  Inpatient Admission  Once                     Admitting Diagnosis: Head injury, closed [S09 90XA]  Injury, unspecified, initial encounter [T14 90XA]  AMS (altered mental status) [R41 82]    HPI: 81 yo F presented on 12/30 via EMS after fall sideways walking down the stairs with head strike on gravel, witnessed by daughter  On no AC/AP medications  On arrival patient GCS 13 with lateral gaze preference  CTH revealed acute bilateral frontal intraparenchymal hemorrhage with extra-axial hematoma with mild mass effect, no midline shift, scattered areas of subarachnoid hemorrhage and she was admitted to the ICU  Procedures Performed:   Orders Placed This Encounter   Procedures    Fast Ultrasound       Summary of Hospital Course: Patient's GCS gradually declined from 13 on presentation to 10 and then an 8  Stephen Roots, patient's daughter was called to discuss clinical status and patient's 5 wishes document that designated her eulalia care providers as health care proxies in event that she had incapacitating illness  The 5 wishes document states that patient wished to be provided treatments to sustain life only if her illness was recoverable  In consultation with neurosurgical team it was agreed with daughter that given severity of traumatic brain injury and 5 wishes documentation that the best course of action was to pursue comfort measures  She was evaluated and approved for inpatient hospice unit       Significant Findings, Care, Treatment and Services Provided:   12/30 [facility] - acute bilateral frontal intraparenchymal hemorrhage with extra-axial hematoma with mild mass effect, no midline shift, scattered areas of subarachnoid hemorrhage  12/30 CT c-spine - negative for traumatic injury   12/30 CT CAP - negative for traumatic injuries     Complications: none     Discharge Diagnosis: Severe multicompartmental traumatic brain injury      Resolved Problems  Date Reviewed: 12/30/2020    None          Condition at Discharge: terminal         Discharge instructions/Information to patient and family:   See after visit summary for information provided to patient and family  Provisions for Follow-Up Care:  See after visit summary for information related to follow-up care and any pertinent home health orders  PCP: Dianna Johnston MD    Disposition: Inpatient Hospice     Planned Readmission: Yes to [id number]       Discharge Statement   I spent 30 minutes discharging the patient  This time was spent on the day of discharge  I had direct contact with the patient on the day of discharge  Additional documentation is required if more than 30 minutes were spent on discharge  Discharge Medications:  See after visit summary for reconciled discharge medications provided to patient and family

## 2020-12-31 NOTE — PROGRESS NOTES
Daily Progress Note - Critical Care   Javier Barillas 80 y o  female MRN: [id number]  Unit/Bed#: ICU 09 Encounter: [id number]        ----------------------------------------------------------------------------------------  HPI/24hr events: Javier Barillas is a 81 y/o F who presented via EMS after falling backwards down approximately 5 concrete steps  Unknown if she experienced head strike or LOC  Per family on the scene, she was more confused than usual after they found her s/p fall  She has a history of mild dementia, but is typically oriented  On evaluation the patient was found to have bifrontal intraparenchymal hemorrhage with associated extra-axial, primarily subarachnoid hemorrhages on CT head, neurosurgery was consulted  Initial GCS was 13 (E4, V4, M5) but declined shortly after admission to the ICU, from 13 to 10 to 8  While requiring intubation from a mental status standpoint, the patient was adequately maintaining oxygen saturation and she was placed on supplemental O2 while her daughter was contacted to discuss goals of care and code status  The patient had 5 wishes documentation that outlined her wish to forgo life-prolonging care in the event of an incapacitating illness; after a multidisciplinary discussion with the patient's daughter, the decision was made to transition to [address] measures      24 hour events:   - s/p fall from 5 steps with bifrontal intraparenchymal hemorrhage with associated extra-axial, primarily subarachnoid hemorrhages on CT head  - declining GCS: 13 --> 10 --> 8  - Multidisciplinary discussion with patient's family regarding goals of care and patient's 5 wishes documentation --> transitioned to [address]    ---------------------------------------------------------------------------------------  SUBJECTIVE  Limited secondary to altered mental status    Review of Systems   Unable to perform ROS: Mental status change     Review of systems was unable to be performed secondary to AMS  ---------------------------------------------------------------------------------------  Assessment and Plan:    Neuro:    Diagnosis: Bifrontal intraparenchymal hemorrhage with associated extra-axial, primarily subarachnoid hemorrhages, s/p fall  o Neurosurgery consulted, signed off after patient was transitioned to [facility] orders placed   Morphine 2 mg IV PRN Q10 minutes   Ativan 0 5 mg IV PRN Q1 hour   Haldol 0 5 mg IV PRN Q1 hour   Glycopyrrolate 0 2 mg IV Q1 hour   Hold all home medications, Comfort Care   Continue dispo planning with Case Management for Hospice House    CV:    Diagnosis: No acute issues  o Plan: see above      Pulm:   Diagnosis: No acute issues  o Plan: see above      GI:    Diagnosis: No acute issues  o Plan: see above    :    Diagnosis: No acute issues  o Plan: see above  o PureWick in place      F/E/N:    Diagnosis: No acute issues  o Plan: see above      Heme/Onc:    Diagnosis: No acute issues  o Plan: see above      Endo:    Diagnosis: No acute issues  o Plan: see above      ID:    Diagnosis: No acute issues  o Plan: see above    MSK/Skin:    Diagnosis: No acute issues  o Plan: see above      Disposition: Comfort Care  Code Status: Level 4 - Comfort Care  ---------------------------------------------------------------------------------------  ICU CORE MEASURES    Prophylaxis   VTE Pharmacologic Prophylaxis: Pharmacologic VTE Prophylaxis contraindicated due to Comfort Care  VTE Mechanical Prophylaxis: reason for no mechanical VTE prophylaxis Comfort Care  Stress Ulcer Prophylaxis: Prophylaxis Not Indicated     ABCDE Protocol (if indicated)  Plan to perform spontaneous awakening trial today? Not applicable  Plan to perform spontaneous breathing trial today? Not applicable  Obvious barriers to extubation?  Not applicable    Invasive Devices Review  Invasive Devices     Peripheral Intravenous Line            Peripheral IV 12/30/20 Left Antecubital less than 1 day    Peripheral IV 20 Left Hand less than 1 day          Drain            External Urinary Catheter less than 1 day              Can any invasive devices be discontinued today? Not applicable  ---------------------------------------------------------------------------------------  OBJECTIVE    Vitals   Vitals:    20 1230 20 1300 20 1400 20 1600   BP: 101/61 149/69 (!) 91/49 105/50   BP Location: Left arm Left arm Left arm Left arm   Pulse: 102 98 96 92   Resp: (!) 28 (!) 23 22 22   Temp: (!) 95 6 °F (35 3 °C)      TempSrc: Rectal      SpO2: 96% 96% 95% 96%   Weight: 65 kg (143 lb 4 8 oz)      Height: 4' 8 5" (1 435 m)        Temp (24hrs), Av 1 °F (35 6 °C), Min:95 6 °F (35 3 °C), Max:96 5 °F (35 8 °C)  Current: Temperature: (!) 95 6 °F (35 3 °C)  HR: 90  BP: 105/50  RR: 22  SpO2: 95%    Respiratory:  SpO2: SpO2: 96 %       Invasive/non-invasive ventilation settings   Respiratory    Lab Data (Last 4 hours)    None         O2/Vent Data (Last 4 hours)    None                Physical Exam  Vitals signs and nursing note reviewed  Constitutional:       Comments: Elderly female lying in bed, somnolent and unable to arouse, but otherwise in no acute distress  HENT:      Head: Normocephalic and atraumatic  Right Ear: External ear normal       Left Ear: External ear normal       Nose: Nose normal       Mouth/Throat:      Mouth: Mucous membranes are moist       Pharynx: Oropharynx is clear  Eyes:      Conjunctiva/sclera: Conjunctivae normal       Comments: Gaze fixed, but pupils 2mm and reactive bilaterally  Neck:      Musculoskeletal: Neck supple  Vascular: No carotid bruit  Cardiovascular:      Rate and Rhythm: Normal rate and regular rhythm  Pulses: Normal pulses  Heart sounds: Normal heart sounds  Pulmonary:      Effort: Pulmonary effort is normal  No respiratory distress  Breath sounds: Normal breath sounds  No wheezing or rales  Abdominal:      General: Abdomen is flat  Bowel sounds are normal  There is no distension  Palpations: Abdomen is soft  Tenderness: There is no guarding  Musculoskeletal:         General: No swelling or tenderness  Skin:     General: Skin is warm and dry  Capillary Refill: Capillary refill takes less than 2 seconds  Neurological:      Comments: Limited secondary to altered mental status  Somnolent, difficult to arouse  GCS 8 (E2 V2 M4)  Laboratory and Diagnostics:  Results from last 7 days   Lab Units 12/30/20  1059 12/30/20  1056   WBC Thousand/uL  --  5 83   HEMOGLOBIN g/dL  --  11 6   I STAT HEMOGLOBIN g/dl 11 6  --    HEMATOCRIT %  --  34 8   HEMATOCRIT, ISTAT % 34*  --    PLATELETS Thousands/uL  --  156   NEUTROS PCT %  --  50   MONOS PCT %  --  7     Results from last 7 days   Lab Units 12/30/20  1059 12/30/20  1056   SODIUM mmol/L  --  141   POTASSIUM mmol/L  --  4 2   CHLORIDE mmol/L  --  108   CO2 mmol/L  --  28   CO2, I-STAT mmol/L 28  --    ANION GAP mmol/L  --  5   BUN mg/dL  --  25   CREATININE mg/dL  --  1 10   CALCIUM mg/dL  --  9 2   GLUCOSE RANDOM mg/dL  --  123          Results from last 7 days   Lab Units 12/30/20  1056   INR  0 96              ABG:    VBG:          Micro        EKG: N/A  Imaging:  I have personally reviewed pertinent reports  and I have personally reviewed pertinent films in PACS    Intake and Output  I/O       12/29 0701 - 12/30 0700 12/30 0701 - 12/31 0700    I V  (mL/kg)  250 (3 8)    IV Piggyback  300    Total Intake(mL/kg)  550 (8 5)    Urine (mL/kg/hr)  511    Total Output  511    Net  +39          Unmeasured Urine Occurrence  2 x    Unmeasured Stool Occurrence  1 x        UOP: 511 cc overnight    Height and Weights   Height: 4' 8 5" (143 5 cm)  IBW: 37 45 kg  Body mass index is 31 56 kg/m²    Weight (last 2 days)     Date/Time   Weight    12/30/20 1230   65 (143 3)    12/30/20 10:55:29   63 5 (139 99)                Nutrition       Diet Orders   (From admission, onward)             Start     Ordered    12/30/20 1121  Diet NPO  Diet effective now     Question Answer Comment   Diet Type NPO    RD to adjust diet per protocol? Yes        12/30/20 1121                  Active Medications  Scheduled Meds:  Current Facility-Administered Medications   Medication Dose Route Frequency Provider Last Rate    glycopyrrolate  0 2 mg Intravenous Q1H PRN Cassie Daugherty, DO      haloperidol lactate  0 5 mg Intravenous Q1H PRN Cassie Daugherty, DO      LORazepam  0 5 mg Intravenous Q1H PRN Cassie Daugherty, DO      morphine injection  2 mg Intravenous Q10 Min PRN Cassie Daugherty, DO       Continuous Infusions:     PRN Meds:   glycopyrrolate, 0 2 mg, Q1H PRN  haloperidol lactate, 0 5 mg, Q1H PRN  LORazepam, 0 5 mg, Q1H PRN  morphine injection, 2 mg, Q10 Min PRN        Allergies   No Known Allergies  ---------------------------------------------------------------------------------------  Advance Directive and Living Will:      Power of :    POLST:    ---------------------------------------------------------------------------------------  Care Time Delivered:   No Critical Care time spent , [address] Tejada MD      Portions of the record may have been created with voice recognition software  Occasional wrong word or "sound a like" substitutions may have occurred due to the inherent limitations of voice recognition software    Read the chart carefully and recognize, using context, where substitutions have occurred

## 2020-12-31 NOTE — COVID-19 HEALTH CARE FACILITY TRANSFER FORM
[facility] to [address] Transfer - COVID-19 Assessment             Name of Patient: Arsh Gupta                : 1925          Transport Date: 20       Has the patient been laboratory tested for COVID-19? [x]  NO  If No,Test was not indicated per  CDC Testing Criteria   May Transfer Patient   [] YES  If Tested Results below     COVID-19 References            No results found for: [address] [facility]         Question is to be completed for any patient who tests positive for COVID-19        1  [] Yes [May Transfer] [] No [May Not Transfer]          Question is to be completed for any patient who is tested for COVID-19            2    [] Yes [May Not Transfer] [] No [May Transfer]          Signature of Physician or Health Care Professional: Kathia Iglesias PA-C 20          Form updated as of 3/24/2020

## 2020-12-31 NOTE — H&P
[facility]      [facility] [place], [address]            Inpatient Hospice Unit                 Phone: [phone] Fax: [phone]       ADMISSION NOTE  Vanesa Miller 80 y o  female [id number]      Hospice Diagnosis / Chief Concern    fall resulting in bifrontal temporal hemorrhage and subarachnoid hemorrhage with a [place] of 10  PPS:10%    Additional Assessments  PLAN  1  Changes to comfort medications    -Haldo: 0 5 Q1H prn for agitation/anxiety/nauseas/hicupps sc/iv  -Ativan: 0 5 Q1H prn for agitation sc/iv- 1 mg Q8H [address] sc/iv  -morphine  2 mg Q10 minutes prn for pain/disnea sc/iv      Current Facility-Administered Medications:     glycopyrrolate (ROBINUL) injection 0 2 mg, 0 2 mg, Intravenous, Q1H PRN, Annitta Prime, DO, 0 2 mg at 12/30/20 2307    haloperidol lactate (HALDOL) injection 0 5 mg, 0 5 mg, Intravenous, Q1H PRN, Annitta Prime, DO    LORazepam (ATIVAN) injection 0 5 mg, 0 5 mg, Intravenous, Q1H PRN, Annitta Prime, DO    morphine injection 2 mg, 2 mg, Intravenous, Q10 Min PRN, Annitta Prime, DO    2  Continue GIP level of care - Requires frequent nursing assessment and parenteral administration of medications symptom management since patient has encephalopathy with  no oral intake Patient is unable to communicate  Her  needs  Ongoing nursing supervision and assistance are required to promote his safety, dignity and comfort                       Decisional apparatus:  Patient is not competent on my exam today  If competence is lost, patient's substitute decision maker would default to daughter by [place] 169  Advance Directive / Living Will / POLST:  none     I have reviewed the patient's controlled substance dispensing history in the Prescription Drug Monitoring Program in compliance with the [facility] regulations before prescribing any controlled substances      Flora Barbosa MD  [facility]  Main line / Answering Service:   You can find me on [employer]! HISTORY OF PRESENT ILLNESS:   Humphrey Solano is a 80 y o  female  With PMH of dementia but oriented at [facility]  15  presented to the Ed after mechanical fall  Secondary to the fall patient suffered bilateral frontal intraparenchymal  hemorrhage and subarachnoid hemorrhage  She was evaluated by Neurology unfortunately patient mental status declining from GCS of  13 to a [place] of 10  And in the view of the necessity for invasive treatment family was contacted and they stated at this point they would like to pursued hospice  Reason why patient was transferred to our house  ROS unable to obtein    History reviewed  No pertinent past medical history  History reviewed  No pertinent surgical history  Social History     Socioeconomic History    Marital status:       Spouse name: Not on file    Number of children: Not on file    Years of education: Not on file    Highest education level: Not on file   Occupational History    Not on file   Social Needs    Financial resource strain: Not on file    Food insecurity     Worry: Not on file     Inability: Not on file    Transportation needs     Medical: Not on file     Non-medical: Not on file   Tobacco Use    Smoking status: Never Smoker    Smokeless tobacco: Never Used   Substance and Sexual Activity    Alcohol use: Never     Frequency: Never    Drug use: Never    Sexual activity: Not Currently   Lifestyle    Physical activity     Days per week: Not on file     Minutes per session: Not on file    Stress: Not on file   Relationships    Social connections     Talks on phone: Not on file     Gets together: Not on file     Attends [employer] service: Not on file     Active member of club or organization: Not on file     Attends meetings of clubs or organizations: Not on file     Relationship status: Not on file    Intimate partner violence     Fear of current or ex partner: Not on file Emotionally abused: Not on file     Physically abused: Not on file     Forced sexual activity: Not on file   Other Topics Concern    Not on file   Social History Narrative    Not on file        History reviewed  No pertinent family history  No Known Allergies    OBJECTIVE:    Physical Exam  Physical Exam  Constitutional:       Comments: Not alerts, no following commands, open the eyes when moved, NAD   HENT:      Head: Normocephalic and atraumatic  Eyes:      Pupils: Pupils are equal, round, and reactive to light  Cardiovascular:      Rate and Rhythm: Normal rate  Pulses: Normal pulses  Pulmonary:      Effort: Pulmonary effort is normal  No respiratory distress  Breath sounds: No wheezing or rales  Comments: Increased Expiratory time, increased breathing sounds  Abdominal:      General: Abdomen is flat  Bowel sounds are normal  There is no distension  Palpations: Abdomen is soft  Skin:     General: Skin is warm  Capillary Refill: Capillary refill takes less than 2 seconds  Lab Results: I have personally reviewed pertinent labs  Imaging Studies: I have personally reviewed pertinent reports  EKG, Pathology, and Other Studies: I have personally reviewed pertinent reports

## 2021-01-04 ENCOUNTER — TRANSITIONAL CARE MANAGEMENT (OUTPATIENT)
Dept: FAMILY MEDICINE CLINIC | Facility: CLINIC | Age: 86
End: 2021-01-04
